# Patient Record
Sex: FEMALE | Race: ASIAN | Employment: UNEMPLOYED | URBAN - METROPOLITAN AREA
[De-identification: names, ages, dates, MRNs, and addresses within clinical notes are randomized per-mention and may not be internally consistent; named-entity substitution may affect disease eponyms.]

---

## 2017-12-21 ENCOUNTER — OFFICE VISIT (OUTPATIENT)
Dept: INTERNAL MEDICINE CLINIC | Age: 62
End: 2017-12-21

## 2017-12-21 VITALS
DIASTOLIC BLOOD PRESSURE: 70 MMHG | HEART RATE: 72 BPM | HEIGHT: 63 IN | WEIGHT: 113 LBS | SYSTOLIC BLOOD PRESSURE: 122 MMHG | OXYGEN SATURATION: 98 % | BODY MASS INDEX: 20.02 KG/M2

## 2017-12-21 DIAGNOSIS — K21.9 GASTROESOPHAGEAL REFLUX DISEASE WITHOUT ESOPHAGITIS: ICD-10-CM

## 2017-12-21 DIAGNOSIS — E04.1 THYROID NODULE: ICD-10-CM

## 2017-12-21 DIAGNOSIS — Z13.1 SCREENING FOR DIABETES MELLITUS: ICD-10-CM

## 2017-12-21 DIAGNOSIS — Z13.220 SCREENING, LIPID: ICD-10-CM

## 2017-12-21 DIAGNOSIS — R07.9 CHEST PAIN, UNSPECIFIED TYPE: Primary | ICD-10-CM

## 2017-12-21 DIAGNOSIS — J30.1 CHRONIC ALLERGIC RHINITIS DUE TO POLLEN, UNSPECIFIED SEASONALITY: ICD-10-CM

## 2017-12-21 DIAGNOSIS — H25.9 SENILE CATARACT, UNSPECIFIED AGE-RELATED CATARACT TYPE, UNSPECIFIED LATERALITY: ICD-10-CM

## 2017-12-21 DIAGNOSIS — F33.0 MILD EPISODE OF RECURRENT MAJOR DEPRESSIVE DISORDER (HCC): ICD-10-CM

## 2017-12-21 DIAGNOSIS — R41.3 MEMORY LOSS: ICD-10-CM

## 2017-12-21 LAB
A/G RATIO: 1.8 (ref 1.1–2.2)
ALBUMIN SERPL-MCNC: 5.1 G/DL (ref 3.4–5)
ALP BLD-CCNC: 147 U/L (ref 40–129)
ALT SERPL-CCNC: 17 U/L (ref 10–40)
ANION GAP SERPL CALCULATED.3IONS-SCNC: 15 MMOL/L (ref 3–16)
AST SERPL-CCNC: 32 U/L (ref 15–37)
BASOPHILS ABSOLUTE: 0 K/UL (ref 0–0.2)
BASOPHILS RELATIVE PERCENT: 0.5 %
BILIRUB SERPL-MCNC: 0.7 MG/DL (ref 0–1)
BUN BLDV-MCNC: 8 MG/DL (ref 7–20)
CALCIUM SERPL-MCNC: 9.8 MG/DL (ref 8.3–10.6)
CHLORIDE BLD-SCNC: 101 MMOL/L (ref 99–110)
CHOLESTEROL, TOTAL: 229 MG/DL (ref 0–199)
CO2: 28 MMOL/L (ref 21–32)
CREAT SERPL-MCNC: 0.8 MG/DL (ref 0.6–1.2)
EOSINOPHILS ABSOLUTE: 0.1 K/UL (ref 0–0.6)
EOSINOPHILS RELATIVE PERCENT: 1.3 %
FOLATE: 5.34 NG/ML (ref 4.78–24.2)
GFR AFRICAN AMERICAN: >60
GFR NON-AFRICAN AMERICAN: >60
GLOBULIN: 2.9 G/DL
GLUCOSE BLD-MCNC: 84 MG/DL (ref 70–99)
HCT VFR BLD CALC: 40.9 % (ref 36–48)
HDLC SERPL-MCNC: 79 MG/DL (ref 40–60)
HEMOGLOBIN: 13.1 G/DL (ref 12–16)
LDL CHOLESTEROL CALCULATED: 98 MG/DL
LYMPHOCYTES ABSOLUTE: 2.4 K/UL (ref 1–5.1)
LYMPHOCYTES RELATIVE PERCENT: 38.8 %
MCH RBC QN AUTO: 30.7 PG (ref 26–34)
MCHC RBC AUTO-ENTMCNC: 32.1 G/DL (ref 31–36)
MCV RBC AUTO: 95.6 FL (ref 80–100)
MONOCYTES ABSOLUTE: 0.5 K/UL (ref 0–1.3)
MONOCYTES RELATIVE PERCENT: 7.7 %
NEUTROPHILS ABSOLUTE: 3.1 K/UL (ref 1.7–7.7)
NEUTROPHILS RELATIVE PERCENT: 51.7 %
PDW BLD-RTO: 13.8 % (ref 12.4–15.4)
PLATELET # BLD: 123 K/UL (ref 135–450)
PMV BLD AUTO: 12.4 FL (ref 5–10.5)
POTASSIUM SERPL-SCNC: 4.6 MMOL/L (ref 3.5–5.1)
RBC # BLD: 4.27 M/UL (ref 4–5.2)
SODIUM BLD-SCNC: 144 MMOL/L (ref 136–145)
TOTAL PROTEIN: 8 G/DL (ref 6.4–8.2)
TRIGL SERPL-MCNC: 261 MG/DL (ref 0–150)
TSH REFLEX: 1.24 UIU/ML (ref 0.27–4.2)
VITAMIN B-12: 382 PG/ML (ref 211–911)
VLDLC SERPL CALC-MCNC: 52 MG/DL
WBC # BLD: 6.1 K/UL (ref 4–11)

## 2017-12-21 PROCEDURE — G8484 FLU IMMUNIZE NO ADMIN: HCPCS | Performed by: INTERNAL MEDICINE

## 2017-12-21 PROCEDURE — 99204 OFFICE O/P NEW MOD 45 MIN: CPT | Performed by: INTERNAL MEDICINE

## 2017-12-21 PROCEDURE — 3014F SCREEN MAMMO DOC REV: CPT | Performed by: INTERNAL MEDICINE

## 2017-12-21 PROCEDURE — 1036F TOBACCO NON-USER: CPT | Performed by: INTERNAL MEDICINE

## 2017-12-21 PROCEDURE — 3017F COLORECTAL CA SCREEN DOC REV: CPT | Performed by: INTERNAL MEDICINE

## 2017-12-21 PROCEDURE — G8427 DOCREV CUR MEDS BY ELIG CLIN: HCPCS | Performed by: INTERNAL MEDICINE

## 2017-12-21 PROCEDURE — G8420 CALC BMI NORM PARAMETERS: HCPCS | Performed by: INTERNAL MEDICINE

## 2017-12-21 RX ORDER — FLUTICASONE PROPIONATE 50 MCG
1 SPRAY, SUSPENSION (ML) NASAL DAILY
Qty: 1 BOTTLE | Refills: 3 | Status: SHIPPED | OUTPATIENT
Start: 2017-12-21 | End: 2018-01-19 | Stop reason: SDUPTHER

## 2017-12-21 RX ORDER — FLUTICASONE PROPIONATE 50 MCG
1 SPRAY, SUSPENSION (ML) NASAL DAILY
COMMUNITY
End: 2017-12-21 | Stop reason: SDUPTHER

## 2017-12-21 RX ORDER — SERTRALINE HYDROCHLORIDE 25 MG/1
25 TABLET, FILM COATED ORAL DAILY
Qty: 30 TABLET | Refills: 3 | Status: SHIPPED | OUTPATIENT
Start: 2017-12-21 | End: 2018-01-19 | Stop reason: SDUPTHER

## 2017-12-21 RX ORDER — PANTOPRAZOLE SODIUM 40 MG/1
40 TABLET, DELAYED RELEASE ORAL DAILY
Qty: 30 TABLET | Refills: 3 | Status: SHIPPED | OUTPATIENT
Start: 2017-12-21 | End: 2018-01-19 | Stop reason: SDUPTHER

## 2017-12-21 RX ORDER — PANTOPRAZOLE SODIUM 40 MG/1
40 TABLET, DELAYED RELEASE ORAL DAILY
COMMUNITY
End: 2017-12-21 | Stop reason: SDUPTHER

## 2017-12-21 NOTE — PROGRESS NOTES
Chief Complaint   Patient presents with    Established New Doctor    Gastroesophageal Reflux    Headache    Memory Loss    Knee Pain     both    Ankle Pain     both    Foot Pain     burning          History of Present Illness:  Cathy Henderson is a 63 y.o. female here to establish care. Chest pain- dull. Worse at night but hurts all day. No known trigger. +shortness of breath sometimes. Dry cough sometimes. +palpitations occasionally. +nausea. No syncope. In [place] she  Was referred to cardiologist  +nervous, anxious at night. She feels lonely. Seems to make the pain more. She has been forgetful the past 8-9 months Forgets where she puts things. Leaves the stove on. No wandering. Poor sleep. She also has a bad headache. Started 1 year ago. Tried tylenol but no longer helps. History:     Past Medical History:   Diagnosis Date    Allergic rhinitis        Past Surgical History:   Procedure Laterality Date    THROAT SURGERY  2017       Social History     Social History    Marital status:      Spouse name: N/A    Number of children: 4    Years of education: N/A     Occupational History    Not on file. Social History Main Topics    Smoking status: Never Smoker    Smokeless tobacco: Former User     Types: Chew    Alcohol use No    Drug use: No    Sexual activity: Not on file     Other Topics Concern    Not on file     Social History Narrative    Lives with son,        Family History   Problem Relation Age of Onset    Diabetes Neg Hx     Heart Disease Neg Hx     Cancer Neg Hx          Review of Systems:  Review of Systems   Constitutional: Negative for fatigue and fever. HENT: Negative for ear pain, hearing loss, postnasal drip, rhinorrhea, sinus pressure, sore throat and tinnitus. Eyes: Negative for redness. Respiratory: Positive for cough and shortness of breath. Negative for chest tightness and wheezing.     Cardiovascular: Positive for chest pain and

## 2017-12-21 NOTE — PATIENT INSTRUCTIONS
Chest pain  Check stress test    Blurry vision  Refer to eye doctor  [facility]  [address] [facility] [facility]  [phone]      Thyroid nodule  Refer to ENT at [facility]    GERD  Refill Protonix    Depression  Start Zoloft 25 mg daily    Memory loss  Check blood work for causes of memory loss    Routine screening  Check lipids and for diabetes    Allergies  Refill Flonase

## 2017-12-22 LAB
ESTIMATED AVERAGE GLUCOSE: 111.2 MG/DL
HBA1C MFR BLD: 5.5 %

## 2017-12-27 ASSESSMENT — ENCOUNTER SYMPTOMS
CONSTIPATION: 0
WHEEZING: 0
CHEST TIGHTNESS: 0
DIARRHEA: 0
VOMITING: 0
SINUS PRESSURE: 0
COUGH: 1
BACK PAIN: 0
SHORTNESS OF BREATH: 1
ABDOMINAL PAIN: 0
EYE REDNESS: 0
RHINORRHEA: 0
NAUSEA: 0
SORE THROAT: 0

## 2018-01-03 ENCOUNTER — HOSPITAL ENCOUNTER (OUTPATIENT)
Dept: NON INVASIVE DIAGNOSTICS | Age: 63
Discharge: HOME | End: 2018-01-03
Attending: INTERNAL MEDICINE | Admitting: INTERNAL MEDICINE

## 2018-01-03 DIAGNOSIS — R07.9 CHEST PAIN: ICD-10-CM

## 2018-01-12 ENCOUNTER — TELEPHONE (OUTPATIENT)
Dept: INTERNAL MEDICINE CLINIC | Age: 63
End: 2018-01-12

## 2018-01-19 ENCOUNTER — OFFICE VISIT (OUTPATIENT)
Dept: INTERNAL MEDICINE CLINIC | Age: 63
End: 2018-01-19

## 2018-01-19 ENCOUNTER — TELEPHONE (OUTPATIENT)
Dept: INTERNAL MEDICINE CLINIC | Age: 63
End: 2018-01-19

## 2018-01-19 VITALS
HEART RATE: 83 BPM | DIASTOLIC BLOOD PRESSURE: 70 MMHG | OXYGEN SATURATION: 97 % | BODY MASS INDEX: 19.66 KG/M2 | WEIGHT: 111 LBS | SYSTOLIC BLOOD PRESSURE: 130 MMHG

## 2018-01-19 DIAGNOSIS — F33.0 MILD EPISODE OF RECURRENT MAJOR DEPRESSIVE DISORDER (HCC): ICD-10-CM

## 2018-01-19 DIAGNOSIS — J30.1 CHRONIC ALLERGIC RHINITIS DUE TO POLLEN, UNSPECIFIED SEASONALITY: ICD-10-CM

## 2018-01-19 DIAGNOSIS — R41.3 MEMORY LOSS: ICD-10-CM

## 2018-01-19 DIAGNOSIS — G43.009 MIGRAINE WITHOUT AURA AND WITHOUT STATUS MIGRAINOSUS, NOT INTRACTABLE: Primary | ICD-10-CM

## 2018-01-19 DIAGNOSIS — K21.9 GASTROESOPHAGEAL REFLUX DISEASE WITHOUT ESOPHAGITIS: ICD-10-CM

## 2018-01-19 PROCEDURE — 3017F COLORECTAL CA SCREEN DOC REV: CPT | Performed by: INTERNAL MEDICINE

## 2018-01-19 PROCEDURE — 3014F SCREEN MAMMO DOC REV: CPT | Performed by: INTERNAL MEDICINE

## 2018-01-19 PROCEDURE — 99214 OFFICE O/P EST MOD 30 MIN: CPT | Performed by: INTERNAL MEDICINE

## 2018-01-19 PROCEDURE — 1036F TOBACCO NON-USER: CPT | Performed by: INTERNAL MEDICINE

## 2018-01-19 PROCEDURE — G8420 CALC BMI NORM PARAMETERS: HCPCS | Performed by: INTERNAL MEDICINE

## 2018-01-19 PROCEDURE — G8484 FLU IMMUNIZE NO ADMIN: HCPCS | Performed by: INTERNAL MEDICINE

## 2018-01-19 PROCEDURE — G8427 DOCREV CUR MEDS BY ELIG CLIN: HCPCS | Performed by: INTERNAL MEDICINE

## 2018-01-19 RX ORDER — PANTOPRAZOLE SODIUM 40 MG/1
40 TABLET, DELAYED RELEASE ORAL DAILY
Qty: 30 TABLET | Refills: 3 | Status: SHIPPED | OUTPATIENT
Start: 2018-01-19 | End: 2018-05-24 | Stop reason: SDUPTHER

## 2018-01-19 RX ORDER — SUMATRIPTAN 50 MG/1
50 TABLET, FILM COATED ORAL
Qty: 9 TABLET | Refills: 3 | Status: SHIPPED | OUTPATIENT
Start: 2018-01-19 | End: 2018-02-23 | Stop reason: SDUPTHER

## 2018-01-19 RX ORDER — SERTRALINE HYDROCHLORIDE 25 MG/1
25 TABLET, FILM COATED ORAL DAILY
Qty: 30 TABLET | Refills: 3 | Status: SHIPPED | OUTPATIENT
Start: 2018-01-19 | End: 2018-05-07 | Stop reason: SDUPTHER

## 2018-01-19 RX ORDER — FLUTICASONE PROPIONATE 50 MCG
1 SPRAY, SUSPENSION (ML) NASAL DAILY
Qty: 1 BOTTLE | Refills: 3 | Status: SHIPPED | OUTPATIENT
Start: 2018-01-19 | End: 2018-05-24 | Stop reason: SDUPTHER

## 2018-01-19 ASSESSMENT — ENCOUNTER SYMPTOMS
VOMITING: 0
CHEST TIGHTNESS: 0
ABDOMINAL PAIN: 0
EYE REDNESS: 0
SORE THROAT: 0
CONSTIPATION: 0
SHORTNESS OF BREATH: 0
NAUSEA: 0
WHEEZING: 0
BACK PAIN: 0
COUGH: 0
DIARRHEA: 0
RHINORRHEA: 0
SINUS PRESSURE: 0

## 2018-01-19 NOTE — PATIENT INSTRUCTIONS
Migraine  Check Brain MRI  Take Sumatriptan as needed for headache     Thyroid Nodule  Refer to [facility] ENT

## 2018-01-19 NOTE — PROGRESS NOTES
Objective:    Vitals:    01/19/18 1412   BP: 130/70   Pulse: 83   SpO2: 97%   Weight: 111 lb (50.3 kg)     Wt Readings from Last 3 Encounters:   01/19/18 111 lb (50.3 kg)   12/21/17 113 lb (51.3 kg)       Body mass index is 19.66 kg/m². Physical Exam   Constitutional: She appears well-developed and well-nourished. No distress. HENT:   Head: Normocephalic. Nose: Right sinus exhibits frontal sinus tenderness. Left sinus exhibits frontal sinus tenderness. Mouth/Throat: Oropharynx is clear and moist. No oropharyngeal exudate. Neck:       Cardiovascular: Normal rate, regular rhythm and normal heart sounds. No murmur heard. Pulmonary/Chest: Effort normal and breath sounds normal.   Abdominal: Soft. She exhibits no distension. There is no tenderness. Musculoskeletal: She exhibits no edema. Skin: Skin is warm. No rash noted. Assessment:    1. Migraine without aura and without status migrainosus, not intractable  Patient with daily headache associated with photophobia and phonophobia. Concerned for medication overuse headache. Explained this today. Sinus disease is also a possibility- she does have sinus TTP. Recommend imaging given her age and questionable sinus disease. She can take Imitrex   - MRI Brain W WO Contrast; Future  - SUMAtriptan (IMITREX) 50 MG tablet; Take 1 tablet by mouth once as needed for Migraine May repeat in two hours if no improvement for a maximum  Dispense: 9 tablet; Refill: 3    2. Gastroesophageal reflux disease without esophagitis  Continue PPI    3. Chronic allergic rhinitis due to pollen, unspecified seasonality  Continue flonase     4. Mild episode of recurrent major depressive disorder ([address]  Continue Zoloft     5.  Memory loss           Plan/Patient Instructions:    Patient Instructions   Migraine  Check Brain MRI  Take Sumatriptan as needed for headache     Thyroid Nodule  Refer to  ENT           Return in about 4 weeks (around 2/16/2018) for Routine follow up .        Marylen South

## 2018-01-29 ENCOUNTER — HOSPITAL ENCOUNTER (OUTPATIENT)
Dept: MRI IMAGING | Age: 63
Discharge: HOME | End: 2018-01-29
Attending: INTERNAL MEDICINE | Admitting: INTERNAL MEDICINE

## 2018-01-29 DIAGNOSIS — G43.009 MIGRAINE WITHOUT AURA AND WITHOUT STATUS MIGRAINOSUS, NOT INTRACTABLE: ICD-10-CM

## 2018-01-29 RX ORDER — SODIUM CHLORIDE 0.9 % (FLUSH) 0.9 %
10 SYRINGE (ML) INJECTION ONCE
Status: COMPLETED | OUTPATIENT
Start: 2018-01-29 | End: 2018-01-29

## 2018-01-29 RX ADMIN — Medication 10 ML: at 12:26

## 2018-02-07 ENCOUNTER — TELEPHONE (OUTPATIENT)
Dept: INTERNAL MEDICINE CLINIC | Age: 63
End: 2018-02-07

## 2018-02-23 ENCOUNTER — OFFICE VISIT (OUTPATIENT)
Dept: INTERNAL MEDICINE CLINIC | Age: 63
End: 2018-02-23

## 2018-02-23 ENCOUNTER — TELEPHONE (OUTPATIENT)
Dept: INTERNAL MEDICINE CLINIC | Age: 63
End: 2018-02-23

## 2018-02-23 VITALS
BODY MASS INDEX: 20.19 KG/M2 | DIASTOLIC BLOOD PRESSURE: 68 MMHG | OXYGEN SATURATION: 98 % | HEART RATE: 81 BPM | WEIGHT: 114 LBS | SYSTOLIC BLOOD PRESSURE: 110 MMHG

## 2018-02-23 DIAGNOSIS — G43.009 MIGRAINE WITHOUT AURA AND WITHOUT STATUS MIGRAINOSUS, NOT INTRACTABLE: ICD-10-CM

## 2018-02-23 DIAGNOSIS — K21.9 GASTROESOPHAGEAL REFLUX DISEASE WITHOUT ESOPHAGITIS: ICD-10-CM

## 2018-02-23 DIAGNOSIS — F33.0 MILD EPISODE OF RECURRENT MAJOR DEPRESSIVE DISORDER (HCC): ICD-10-CM

## 2018-02-23 DIAGNOSIS — G47.62 NOCTURNAL LEG CRAMPS: ICD-10-CM

## 2018-02-23 DIAGNOSIS — F02.80 ALZHEIMER'S DEMENTIA WITHOUT BEHAVIORAL DISTURBANCE, UNSPECIFIED TIMING OF DEMENTIA ONSET: ICD-10-CM

## 2018-02-23 DIAGNOSIS — G47.62 NOCTURNAL LEG CRAMPS: Primary | ICD-10-CM

## 2018-02-23 DIAGNOSIS — E04.1 THYROID NODULE: ICD-10-CM

## 2018-02-23 DIAGNOSIS — G30.9 ALZHEIMER'S DEMENTIA WITHOUT BEHAVIORAL DISTURBANCE, UNSPECIFIED TIMING OF DEMENTIA ONSET: ICD-10-CM

## 2018-02-23 DIAGNOSIS — J30.1 CHRONIC ALLERGIC RHINITIS DUE TO POLLEN, UNSPECIFIED SEASONALITY: ICD-10-CM

## 2018-02-23 LAB
IRON SATURATION: 31 % (ref 15–50)
IRON: 131 UG/DL (ref 37–145)
TOTAL IRON BINDING CAPACITY: 423 UG/DL (ref 260–445)
VITAMIN D 25-HYDROXY: 16.6 NG/ML

## 2018-02-23 PROCEDURE — 1036F TOBACCO NON-USER: CPT | Performed by: INTERNAL MEDICINE

## 2018-02-23 PROCEDURE — G8420 CALC BMI NORM PARAMETERS: HCPCS | Performed by: INTERNAL MEDICINE

## 2018-02-23 PROCEDURE — G8484 FLU IMMUNIZE NO ADMIN: HCPCS | Performed by: INTERNAL MEDICINE

## 2018-02-23 PROCEDURE — 99214 OFFICE O/P EST MOD 30 MIN: CPT | Performed by: INTERNAL MEDICINE

## 2018-02-23 PROCEDURE — 3014F SCREEN MAMMO DOC REV: CPT | Performed by: INTERNAL MEDICINE

## 2018-02-23 PROCEDURE — 3017F COLORECTAL CA SCREEN DOC REV: CPT | Performed by: INTERNAL MEDICINE

## 2018-02-23 PROCEDURE — G8427 DOCREV CUR MEDS BY ELIG CLIN: HCPCS | Performed by: INTERNAL MEDICINE

## 2018-02-23 RX ORDER — DONEPEZIL HYDROCHLORIDE 5 MG/1
5 TABLET, FILM COATED ORAL NIGHTLY
Qty: 30 TABLET | Refills: 3 | Status: SHIPPED | OUTPATIENT
Start: 2018-02-23 | End: 2018-05-24 | Stop reason: SDUPTHER

## 2018-02-23 RX ORDER — SUMATRIPTAN 50 MG/1
50 TABLET, FILM COATED ORAL
Qty: 9 TABLET | Refills: 3 | Status: SHIPPED | OUTPATIENT
Start: 2018-02-23 | End: 2018-05-24 | Stop reason: SDUPTHER

## 2018-02-23 RX ORDER — DONEPEZIL HYDROCHLORIDE 5 MG/1
5 TABLET, FILM COATED ORAL NIGHTLY
Qty: 30 TABLET | Refills: 3 | Status: SHIPPED | OUTPATIENT
Start: 2018-02-23 | End: 2018-02-23 | Stop reason: SDUPTHER

## 2018-02-23 ASSESSMENT — ENCOUNTER SYMPTOMS
EYES NEGATIVE: 1
RESPIRATORY NEGATIVE: 1
ABDOMINAL PAIN: 1
BACK PAIN: 1

## 2018-02-23 NOTE — PROGRESS NOTES
depression 30 tablet 3     No current facility-administered medications on file prior to visit. Review of Systems:    Review of Systems   Constitutional: Negative for appetite change, fatigue and unexpected weight change. HENT: Negative. Eyes: Negative. Respiratory: Negative. Cardiovascular: Negative. Gastrointestinal: Positive for abdominal pain (heartburn). Endocrine: Negative. Genitourinary: Negative. Musculoskeletal: Positive for back pain and myalgias. Neurological: Positive for tingling, numbness and headaches (better with Imitrex ). Psychiatric/Behavioral: Positive for confusion (patient cannot remember anything) and dysphoric mood. Negative for agitation. The patient does not have insomnia. Objective:    Vitals:    02/23/18 1029   BP: 110/68   Pulse: 81   SpO2: 98%   Weight: 114 lb (51.7 kg)     Wt Readings from Last 3 Encounters:   02/23/18 114 lb (51.7 kg)   01/19/18 111 lb (50.3 kg)   12/21/17 113 lb (51.3 kg)       Body mass index is 20.19 kg/m². Physical Exam   Constitutional: She appears well-developed and well-nourished. She does not have a sickly appearance. HENT:   Head: Atraumatic. Right Ear: Hearing, tympanic membrane, external ear and ear canal normal.   Left Ear: Hearing, tympanic membrane, external ear and ear canal normal.   Nose: Nose normal. No mucosal edema or rhinorrhea. Eyes: Pupils are equal, round, and reactive to light. No scleral icterus. Neck: Trachea normal. No thyroid mass and no thyromegaly present. Cardiovascular: Normal rate, regular rhythm, S1 normal and S2 normal.    No murmur heard. Pulmonary/Chest: Effort normal and breath sounds normal. No respiratory distress. She has no wheezes. She has no rhonchi. She has no rales. Abdominal: Soft. Bowel sounds are normal. There is no tenderness. Lymphadenopathy:     She has no cervical adenopathy. Neurological: She is alert. She is not disoriented. She displays no tremor.  No cranial nerve deficit. She exhibits normal muscle tone. Coordination and gait normal.   Skin: Skin is warm and dry. No rash noted. Assessment:    1. Nocturnal leg cramps  Check iron and vitamin D. She reports drinking plenty of fluids. Provided stretches   - Iron and TIBC; Future  - Vitamin D 25 Hydroxy; Future    2. Alzheimer's dementia without behavioral disturbance, unspecified timing of dementia onset  She had a normal MRI, B12, folate, TSH. Concerned for dementia. Recommend trial of Aricept   - donepezil (ARICEPT) 5 MG tablet; Take 1 tablet by mouth nightly For dementia  Dispense: 30 tablet; Refill: 3    3. Migraine without aura and without status migrainosus, not intractable  Improved on sumatriptan   - SUMAtriptan (IMITREX) 50 MG tablet; Take 1 tablet by mouth once as needed for Migraine May repeat in two hours if no improvement. Max two tabs a day. For headache  Dispense: 9 tablet; Refill: 3    4. Thyroid nodule  Encouraged patient to schedule an appointment with ENT at [facility]     5. Chronic allergic rhinitis due to pollen, unspecified seasonality  Continue flonase     6. Gastroesophageal reflux disease without esophagitis  Continue PPI     7. Mild episode of recurrent major depressive disorder (HCC)  Stable. Plan/Patient Instructions:    Patient Instructions   Leg cramps  Do stretches twice a day  Check iron and vitamin D    Dementia  Start Aricept    Migraine  Continue Imitrex as needed     Thyroid Nodule  Schedule an appointment with  ENT    Allergies  Continue Flonase    Depression   Continue Zoloft        Return in about 3 months (around 5/23/2018) for Dementia, depression, leg cramps .        Teresita Dong

## 2018-02-23 NOTE — PATIENT INSTRUCTIONS
Leg cramps  Do stretches twice a day  Check iron and vitamin D    Dementia  Start Aricept    Migraine  Continue Imitrex as needed     Thyroid Nodule  Schedule an appointment with [facility]    Allergies  Continue Flonase    Depression   Continue Zoloft

## 2018-02-28 DIAGNOSIS — E55.9 VITAMIN D DEFICIENCY: Primary | ICD-10-CM

## 2018-02-28 RX ORDER — ERGOCALCIFEROL (VITAMIN D2) 1250 MCG
50000 CAPSULE ORAL WEEKLY
Qty: 12 CAPSULE | Refills: 0 | Status: SHIPPED | OUTPATIENT
Start: 2018-02-28 | End: 2018-05-11 | Stop reason: SDUPTHER

## 2018-02-28 RX ORDER — CARBOXYMETHYLCELLULOSE SODIUM 5 MG/ML
1 SOLUTION/ DROPS OPHTHALMIC 4 TIMES DAILY
Qty: 1 BOTTLE | Refills: 5 | Status: SHIPPED | OUTPATIENT
Start: 2018-02-28 | End: 2018-05-24 | Stop reason: SDUPTHER

## 2018-05-07 DIAGNOSIS — F33.0 MILD EPISODE OF RECURRENT MAJOR DEPRESSIVE DISORDER (HCC): ICD-10-CM

## 2018-05-07 DIAGNOSIS — R41.3 MEMORY LOSS: ICD-10-CM

## 2018-05-07 RX ORDER — SERTRALINE HYDROCHLORIDE 25 MG/1
25 TABLET, FILM COATED ORAL DAILY
Qty: 30 TABLET | Refills: 3 | Status: SHIPPED | OUTPATIENT
Start: 2018-05-07 | End: 2018-08-23 | Stop reason: SDUPTHER

## 2018-05-11 DIAGNOSIS — E55.9 VITAMIN D DEFICIENCY: ICD-10-CM

## 2018-05-11 RX ORDER — ERGOCALCIFEROL 1.25 MG/1
CAPSULE ORAL
Qty: 12 CAPSULE | Refills: 0 | Status: SHIPPED | OUTPATIENT
Start: 2018-05-11 | End: 2018-07-27 | Stop reason: SDUPTHER

## 2018-05-24 ENCOUNTER — OFFICE VISIT (OUTPATIENT)
Dept: INTERNAL MEDICINE CLINIC | Age: 63
End: 2018-05-24

## 2018-05-24 VITALS
HEART RATE: 83 BPM | SYSTOLIC BLOOD PRESSURE: 104 MMHG | DIASTOLIC BLOOD PRESSURE: 66 MMHG | WEIGHT: 111 LBS | BODY MASS INDEX: 19.66 KG/M2 | OXYGEN SATURATION: 98 %

## 2018-05-24 DIAGNOSIS — Z11.4 SCREENING FOR HIV (HUMAN IMMUNODEFICIENCY VIRUS): ICD-10-CM

## 2018-05-24 DIAGNOSIS — Z12.31 ENCOUNTER FOR SCREENING MAMMOGRAM FOR MALIGNANT NEOPLASM OF BREAST: ICD-10-CM

## 2018-05-24 DIAGNOSIS — K21.9 GASTROESOPHAGEAL REFLUX DISEASE WITHOUT ESOPHAGITIS: Primary | ICD-10-CM

## 2018-05-24 DIAGNOSIS — E55.9 VITAMIN D DEFICIENCY: ICD-10-CM

## 2018-05-24 DIAGNOSIS — Z12.11 ENCOUNTER FOR SCREENING COLONOSCOPY: ICD-10-CM

## 2018-05-24 DIAGNOSIS — Z11.59 ENCOUNTER FOR HEPATITIS C SCREENING TEST FOR LOW RISK PATIENT: ICD-10-CM

## 2018-05-24 DIAGNOSIS — J30.1 CHRONIC ALLERGIC RHINITIS DUE TO POLLEN, UNSPECIFIED SEASONALITY: ICD-10-CM

## 2018-05-24 DIAGNOSIS — F02.80 ALZHEIMER'S DEMENTIA WITHOUT BEHAVIORAL DISTURBANCE, UNSPECIFIED TIMING OF DEMENTIA ONSET: ICD-10-CM

## 2018-05-24 DIAGNOSIS — M94.0 COSTOCHONDRITIS: ICD-10-CM

## 2018-05-24 DIAGNOSIS — G43.009 MIGRAINE WITHOUT AURA AND WITHOUT STATUS MIGRAINOSUS, NOT INTRACTABLE: ICD-10-CM

## 2018-05-24 DIAGNOSIS — G30.9 ALZHEIMER'S DEMENTIA WITHOUT BEHAVIORAL DISTURBANCE, UNSPECIFIED TIMING OF DEMENTIA ONSET: ICD-10-CM

## 2018-05-24 LAB
A/G RATIO: 1.8 (ref 1.1–2.2)
ALBUMIN SERPL-MCNC: 4.8 G/DL (ref 3.4–5)
ALP BLD-CCNC: 110 U/L (ref 40–129)
ALT SERPL-CCNC: 15 U/L (ref 10–40)
ANION GAP SERPL CALCULATED.3IONS-SCNC: 11 MMOL/L (ref 3–16)
AST SERPL-CCNC: 28 U/L (ref 15–37)
BASOPHILS ABSOLUTE: 0 K/UL (ref 0–0.2)
BASOPHILS RELATIVE PERCENT: 1 %
BILIRUB SERPL-MCNC: 0.6 MG/DL (ref 0–1)
BUN BLDV-MCNC: 9 MG/DL (ref 7–20)
CALCIUM SERPL-MCNC: 9.8 MG/DL (ref 8.3–10.6)
CHLORIDE BLD-SCNC: 98 MMOL/L (ref 99–110)
CO2: 29 MMOL/L (ref 21–32)
CREAT SERPL-MCNC: 0.7 MG/DL (ref 0.6–1.2)
EOSINOPHILS ABSOLUTE: 0.1 K/UL (ref 0–0.6)
EOSINOPHILS RELATIVE PERCENT: 2.5 %
GFR AFRICAN AMERICAN: >60
GFR NON-AFRICAN AMERICAN: >60
GLOBULIN: 2.7 G/DL
GLUCOSE BLD-MCNC: 78 MG/DL (ref 70–99)
HCT VFR BLD CALC: 40.7 % (ref 36–48)
HEMOGLOBIN: 13.5 G/DL (ref 12–16)
HEPATITIS C ANTIBODY: NORMAL
LYMPHOCYTES ABSOLUTE: 1.7 K/UL (ref 1–5.1)
LYMPHOCYTES RELATIVE PERCENT: 36.3 %
MCH RBC QN AUTO: 30.9 PG (ref 26–34)
MCHC RBC AUTO-ENTMCNC: 33.3 G/DL (ref 31–36)
MCV RBC AUTO: 93 FL (ref 80–100)
MONOCYTES ABSOLUTE: 0.5 K/UL (ref 0–1.3)
MONOCYTES RELATIVE PERCENT: 10.1 %
NEUTROPHILS ABSOLUTE: 2.3 K/UL (ref 1.7–7.7)
NEUTROPHILS RELATIVE PERCENT: 50.1 %
PDW BLD-RTO: 13.6 % (ref 12.4–15.4)
PLATELET # BLD: 132 K/UL (ref 135–450)
PMV BLD AUTO: 11.4 FL (ref 5–10.5)
POTASSIUM SERPL-SCNC: 5 MMOL/L (ref 3.5–5.1)
RBC # BLD: 4.38 M/UL (ref 4–5.2)
SODIUM BLD-SCNC: 138 MMOL/L (ref 136–145)
TOTAL PROTEIN: 7.5 G/DL (ref 6.4–8.2)
VITAMIN D 25-HYDROXY: 28.8 NG/ML
WBC # BLD: 4.6 K/UL (ref 4–11)

## 2018-05-24 PROCEDURE — 99214 OFFICE O/P EST MOD 30 MIN: CPT | Performed by: INTERNAL MEDICINE

## 2018-05-24 PROCEDURE — 1036F TOBACCO NON-USER: CPT | Performed by: INTERNAL MEDICINE

## 2018-05-24 PROCEDURE — G8427 DOCREV CUR MEDS BY ELIG CLIN: HCPCS | Performed by: INTERNAL MEDICINE

## 2018-05-24 PROCEDURE — 3017F COLORECTAL CA SCREEN DOC REV: CPT | Performed by: INTERNAL MEDICINE

## 2018-05-24 PROCEDURE — G8420 CALC BMI NORM PARAMETERS: HCPCS | Performed by: INTERNAL MEDICINE

## 2018-05-24 RX ORDER — DONEPEZIL HYDROCHLORIDE 5 MG/1
5 TABLET, FILM COATED ORAL NIGHTLY
Qty: 30 TABLET | Refills: 3 | Status: SHIPPED | OUTPATIENT
Start: 2018-05-24 | End: 2018-09-25 | Stop reason: SDUPTHER

## 2018-05-24 RX ORDER — FLUTICASONE PROPIONATE 50 MCG
1 SPRAY, SUSPENSION (ML) NASAL DAILY
Qty: 1 BOTTLE | Refills: 3 | Status: SHIPPED | OUTPATIENT
Start: 2018-05-24 | End: 2019-01-11 | Stop reason: SDUPTHER

## 2018-05-24 RX ORDER — CARBOXYMETHYLCELLULOSE SODIUM 5 MG/ML
1 SOLUTION/ DROPS OPHTHALMIC 4 TIMES DAILY
Qty: 1 BOTTLE | Refills: 5 | Status: SHIPPED | OUTPATIENT
Start: 2018-05-24 | End: 2019-06-24 | Stop reason: SDUPTHER

## 2018-05-24 RX ORDER — SUMATRIPTAN 50 MG/1
50 TABLET, FILM COATED ORAL
Qty: 9 TABLET | Refills: 3 | Status: SHIPPED | OUTPATIENT
Start: 2018-05-24 | End: 2019-03-22 | Stop reason: SDUPTHER

## 2018-05-24 RX ORDER — PANTOPRAZOLE SODIUM 40 MG/1
40 TABLET, DELAYED RELEASE ORAL DAILY
Qty: 30 TABLET | Refills: 3 | Status: SHIPPED | OUTPATIENT
Start: 2018-05-24 | End: 2018-09-18 | Stop reason: SDUPTHER

## 2018-05-24 RX ORDER — SUCRALFATE ORAL 1 G/10ML
1 SUSPENSION ORAL NIGHTLY
Qty: 1200 ML | Refills: 3 | Status: SHIPPED | OUTPATIENT
Start: 2018-05-24 | End: 2019-05-29 | Stop reason: SDUPTHER

## 2018-05-24 ASSESSMENT — ENCOUNTER SYMPTOMS
SHORTNESS OF BREATH: 1
NAUSEA: 1
ABDOMINAL PAIN: 1

## 2018-05-24 ASSESSMENT — PATIENT HEALTH QUESTIONNAIRE - PHQ9
SUM OF ALL RESPONSES TO PHQ9 QUESTIONS 1 & 2: 2
1. LITTLE INTEREST OR PLEASURE IN DOING THINGS: 1
2. FEELING DOWN, DEPRESSED OR HOPELESS: 1
SUM OF ALL RESPONSES TO PHQ QUESTIONS 1-9: 2

## 2018-05-25 LAB
HIV AG/AB: NORMAL
HIV ANTIGEN: NORMAL
HIV-1 ANTIBODY: NORMAL
HIV-2 AB: NORMAL

## 2018-05-28 ASSESSMENT — ENCOUNTER SYMPTOMS
CHEST TIGHTNESS: 0
COUGH: 0
EYE REDNESS: 0
HEMOPTYSIS: 0
CONSTIPATION: 0
SORE THROAT: 0
DIARRHEA: 0
SPUTUM PRODUCTION: 0
WHEEZING: 0
BACK PAIN: 0
RHINORRHEA: 0
SINUS PRESSURE: 0
ORTHOPNEA: 0
VOMITING: 0

## 2018-05-29 ENCOUNTER — HOSPITAL ENCOUNTER (OUTPATIENT)
Dept: MAMMOGRAPHY | Age: 63
Discharge: HOME | End: 2018-05-29
Attending: INTERNAL MEDICINE | Admitting: INTERNAL MEDICINE

## 2018-05-29 ENCOUNTER — TELEPHONE (OUTPATIENT)
Dept: INTERNAL MEDICINE CLINIC | Age: 63
End: 2018-05-29

## 2018-05-29 DIAGNOSIS — Z12.31 ENCOUNTER FOR SCREENING MAMMOGRAM FOR MALIGNANT NEOPLASM OF BREAST: ICD-10-CM

## 2018-06-20 ENCOUNTER — TELEPHONE (OUTPATIENT)
Dept: INTERNAL MEDICINE CLINIC | Age: 63
End: 2018-06-20

## 2018-06-21 ENCOUNTER — TELEPHONE (OUTPATIENT)
Dept: INTERNAL MEDICINE CLINIC | Age: 63
End: 2018-06-21

## 2018-07-25 ENCOUNTER — TELEPHONE (OUTPATIENT)
Dept: INTERNAL MEDICINE CLINIC | Age: 63
End: 2018-07-25

## 2018-07-27 ENCOUNTER — OFFICE VISIT (OUTPATIENT)
Dept: INTERNAL MEDICINE CLINIC | Age: 63
End: 2018-07-27

## 2018-07-27 VITALS
OXYGEN SATURATION: 98 % | BODY MASS INDEX: 19.84 KG/M2 | SYSTOLIC BLOOD PRESSURE: 98 MMHG | DIASTOLIC BLOOD PRESSURE: 60 MMHG | WEIGHT: 112 LBS | HEART RATE: 71 BPM

## 2018-07-27 DIAGNOSIS — F01.50 VASCULAR DEMENTIA WITHOUT BEHAVIORAL DISTURBANCE (HCC): ICD-10-CM

## 2018-07-27 DIAGNOSIS — E55.9 VITAMIN D DEFICIENCY: ICD-10-CM

## 2018-07-27 DIAGNOSIS — G43.009 MIGRAINE WITHOUT AURA AND WITHOUT STATUS MIGRAINOSUS, NOT INTRACTABLE: ICD-10-CM

## 2018-07-27 DIAGNOSIS — K21.9 GASTROESOPHAGEAL REFLUX DISEASE WITHOUT ESOPHAGITIS: Primary | ICD-10-CM

## 2018-07-27 DIAGNOSIS — G47.62 NOCTURNAL LEG CRAMPS: ICD-10-CM

## 2018-07-27 PROBLEM — F02.80 DEMENTIA ASSOCIATED WITH OTHER UNDERLYING DISEASE WITHOUT BEHAVIORAL DISTURBANCE (HCC): Status: ACTIVE | Noted: 2018-07-27

## 2018-07-27 PROCEDURE — 3017F COLORECTAL CA SCREEN DOC REV: CPT | Performed by: INTERNAL MEDICINE

## 2018-07-27 PROCEDURE — 1036F TOBACCO NON-USER: CPT | Performed by: INTERNAL MEDICINE

## 2018-07-27 PROCEDURE — G8420 CALC BMI NORM PARAMETERS: HCPCS | Performed by: INTERNAL MEDICINE

## 2018-07-27 PROCEDURE — 99214 OFFICE O/P EST MOD 30 MIN: CPT | Performed by: INTERNAL MEDICINE

## 2018-07-27 PROCEDURE — G8427 DOCREV CUR MEDS BY ELIG CLIN: HCPCS | Performed by: INTERNAL MEDICINE

## 2018-07-27 RX ORDER — MULTIVIT-MIN/IRON FUM/FOLIC AC 7.5 MG-4
1 TABLET ORAL DAILY
Qty: 30 TABLET | Refills: 5 | Status: SHIPPED | OUTPATIENT
Start: 2018-07-27 | End: 2019-01-21 | Stop reason: SDUPTHER

## 2018-07-27 RX ORDER — ERGOCALCIFEROL 1.25 MG/1
CAPSULE ORAL
Qty: 12 CAPSULE | Refills: 0 | Status: SHIPPED | OUTPATIENT
Start: 2018-07-27 | End: 2018-10-13 | Stop reason: SDUPTHER

## 2018-07-27 ASSESSMENT — ENCOUNTER SYMPTOMS
VOMITING: 0
CHEST TIGHTNESS: 0
ABDOMINAL PAIN: 0
WHEEZING: 0
SORE THROAT: 0
SHORTNESS OF BREATH: 0
CONSTIPATION: 0
RHINORRHEA: 0
NAUSEA: 0
BACK PAIN: 0
SINUS PRESSURE: 0
DIARRHEA: 0
COUGH: 0
EYE REDNESS: 0

## 2018-07-27 NOTE — PROGRESS NOTES
MG tablet Take 1 tablet by mouth nightly For dementia 30 tablet 3    carboxymethylcellulose PF (REFRESH PLUS) 0.5 % SOLN ophthalmic solution Place 1 drop into both eyes 4 times daily 1 Bottle 5    sucralfate (CARAFATE) 1 GM/10ML suspension Take 10 mLs by mouth nightly For chest pain/gastric pain 1200 mL 3    diclofenac sodium 1 % GEL Apply 2 g topically 2 times daily For chest wall pain 1 Tube 3    sertraline (ZOLOFT) 25 MG tablet TAKE 1 TABLET BY MOUTH DAILY FOR DEPRESSION 30 tablet 3     No current facility-administered medications on file prior to visit. Review of Systems:    Review of Systems   Constitutional: Negative for fatigue and fever. HENT: Negative for ear pain, hearing loss, postnasal drip, rhinorrhea, sinus pressure, sore throat and tinnitus. Eyes: Negative for redness. Respiratory: Negative for cough, chest tightness, shortness of breath and wheezing. Cardiovascular: Negative for chest pain, palpitations and leg swelling. Gastrointestinal: Negative for abdominal pain, constipation, diarrhea, nausea and vomiting. Genitourinary: Negative for dysuria and frequency. Musculoskeletal: Positive for myalgias. Negative for arthralgias, back pain and joint swelling. Skin: Negative for rash. Neurological: Negative for dizziness, syncope and headaches. Psychiatric/Behavioral: Positive for confusion. Objective:    Vitals:    07/27/18 0921   BP: 98/60   Pulse: 71   SpO2: 98%   Weight: 112 lb (50.8 kg)     Wt Readings from Last 3 Encounters:   07/27/18 112 lb (50.8 kg)   05/24/18 111 lb (50.3 kg)   02/23/18 114 lb (51.7 kg)       Body mass index is 19.84 kg/m². Physical Exam   Constitutional: She appears well-developed and well-nourished. She does not have a sickly appearance. HENT:   Head: Atraumatic.    Right Ear: Hearing, tympanic membrane, external ear and ear canal normal.   Left Ear: Hearing, tympanic membrane, external ear and ear canal normal.   Nose: Nose

## 2018-08-01 ENCOUNTER — TELEPHONE (OUTPATIENT)
Dept: RHEUMATOLOGY | Age: 63
End: 2018-08-01

## 2018-08-02 NOTE — TELEPHONE ENCOUNTER
Received a DENIAL for this medication. States this medication is not covered for the dx of chondrocostal junction syndrome. Denial attached    Please advise patient.

## 2018-08-23 DIAGNOSIS — R41.3 MEMORY LOSS: ICD-10-CM

## 2018-08-23 DIAGNOSIS — F33.0 MILD EPISODE OF RECURRENT MAJOR DEPRESSIVE DISORDER (HCC): ICD-10-CM

## 2018-08-23 RX ORDER — SERTRALINE HYDROCHLORIDE 25 MG/1
25 TABLET, FILM COATED ORAL DAILY
Qty: 30 TABLET | Refills: 3 | Status: SHIPPED | OUTPATIENT
Start: 2018-08-23 | End: 2018-12-25 | Stop reason: SDUPTHER

## 2018-09-18 RX ORDER — PANTOPRAZOLE SODIUM 40 MG/1
40 TABLET, DELAYED RELEASE ORAL DAILY
Qty: 30 TABLET | Refills: 3 | Status: SHIPPED | OUTPATIENT
Start: 2018-09-18 | End: 2019-06-24 | Stop reason: SDUPTHER

## 2018-09-25 DIAGNOSIS — F02.80 ALZHEIMER'S DEMENTIA WITHOUT BEHAVIORAL DISTURBANCE, UNSPECIFIED TIMING OF DEMENTIA ONSET: ICD-10-CM

## 2018-09-25 DIAGNOSIS — G30.9 ALZHEIMER'S DEMENTIA WITHOUT BEHAVIORAL DISTURBANCE, UNSPECIFIED TIMING OF DEMENTIA ONSET: ICD-10-CM

## 2018-09-25 RX ORDER — DONEPEZIL HYDROCHLORIDE 5 MG/1
5 TABLET, FILM COATED ORAL NIGHTLY
Qty: 30 TABLET | Refills: 3 | Status: SHIPPED | OUTPATIENT
Start: 2018-09-25 | End: 2019-06-24 | Stop reason: SDUPTHER

## 2018-09-27 ENCOUNTER — TELEPHONE (OUTPATIENT)
Dept: INTERNAL MEDICINE CLINIC | Age: 63
End: 2018-09-27

## 2018-10-13 DIAGNOSIS — E55.9 VITAMIN D DEFICIENCY: ICD-10-CM

## 2018-10-15 RX ORDER — ERGOCALCIFEROL 1.25 MG/1
CAPSULE ORAL
Qty: 12 CAPSULE | Refills: 0 | Status: SHIPPED | OUTPATIENT
Start: 2018-10-15 | End: 2019-03-06 | Stop reason: SDUPTHER

## 2018-11-26 ENCOUNTER — OFFICE VISIT (OUTPATIENT)
Dept: INTERNAL MEDICINE CLINIC | Age: 63
End: 2018-11-26
Payer: COMMERCIAL

## 2018-11-26 VITALS
SYSTOLIC BLOOD PRESSURE: 106 MMHG | WEIGHT: 114 LBS | BODY MASS INDEX: 18.97 KG/M2 | DIASTOLIC BLOOD PRESSURE: 70 MMHG | HEART RATE: 79 BPM | OXYGEN SATURATION: 100 %

## 2018-11-26 DIAGNOSIS — E55.9 VITAMIN D DEFICIENCY: ICD-10-CM

## 2018-11-26 DIAGNOSIS — M54.50 ACUTE MIDLINE LOW BACK PAIN WITHOUT SCIATICA: Primary | ICD-10-CM

## 2018-11-26 DIAGNOSIS — Z23 NEED FOR HEPATITIS A VACCINATION: ICD-10-CM

## 2018-11-26 DIAGNOSIS — Z23 NEED FOR PROPHYLACTIC VACCINATION AGAINST CHOLERA WITH TYPHOID-PARATYPHOID (CHOLERA + TAB) VACCINE: ICD-10-CM

## 2018-11-26 DIAGNOSIS — E78.5 DYSLIPIDEMIA: ICD-10-CM

## 2018-11-26 DIAGNOSIS — M85.88 OSTEOPENIA OF LUMBAR SPINE: ICD-10-CM

## 2018-11-26 LAB
A/G RATIO: 1.6 (ref 1.1–2.2)
ALBUMIN SERPL-MCNC: 4.6 G/DL (ref 3.4–5)
ALP BLD-CCNC: 128 U/L (ref 40–129)
ALT SERPL-CCNC: 14 U/L (ref 10–40)
ANION GAP SERPL CALCULATED.3IONS-SCNC: 13 MMOL/L (ref 3–16)
AST SERPL-CCNC: 27 U/L (ref 15–37)
BILIRUB SERPL-MCNC: 0.8 MG/DL (ref 0–1)
BUN BLDV-MCNC: 7 MG/DL (ref 7–20)
CALCIUM SERPL-MCNC: 9.7 MG/DL (ref 8.3–10.6)
CHLORIDE BLD-SCNC: 101 MMOL/L (ref 99–110)
CHOLESTEROL, TOTAL: 192 MG/DL (ref 0–199)
CO2: 28 MMOL/L (ref 21–32)
CREAT SERPL-MCNC: 0.8 MG/DL (ref 0.6–1.2)
GFR AFRICAN AMERICAN: >60
GFR NON-AFRICAN AMERICAN: >60
GLOBULIN: 2.8 G/DL
GLUCOSE BLD-MCNC: 80 MG/DL (ref 70–99)
HDLC SERPL-MCNC: 75 MG/DL (ref 40–60)
LDL CHOLESTEROL CALCULATED: 86 MG/DL
POTASSIUM SERPL-SCNC: 4.9 MMOL/L (ref 3.5–5.1)
SODIUM BLD-SCNC: 142 MMOL/L (ref 136–145)
TOTAL PROTEIN: 7.4 G/DL (ref 6.4–8.2)
TRIGL SERPL-MCNC: 157 MG/DL (ref 0–150)
VITAMIN D 25-HYDROXY: 39.9 NG/ML
VLDLC SERPL CALC-MCNC: 31 MG/DL

## 2018-11-26 PROCEDURE — 99214 OFFICE O/P EST MOD 30 MIN: CPT | Performed by: INTERNAL MEDICINE

## 2018-11-26 PROCEDURE — 3017F COLORECTAL CA SCREEN DOC REV: CPT | Performed by: INTERNAL MEDICINE

## 2018-11-26 PROCEDURE — 90686 IIV4 VACC NO PRSV 0.5 ML IM: CPT | Performed by: INTERNAL MEDICINE

## 2018-11-26 PROCEDURE — 90471 IMMUNIZATION ADMIN: CPT | Performed by: INTERNAL MEDICINE

## 2018-11-26 PROCEDURE — 1036F TOBACCO NON-USER: CPT | Performed by: INTERNAL MEDICINE

## 2018-11-26 PROCEDURE — 90472 IMMUNIZATION ADMIN EACH ADD: CPT | Performed by: INTERNAL MEDICINE

## 2018-11-26 PROCEDURE — G8482 FLU IMMUNIZE ORDER/ADMIN: HCPCS | Performed by: INTERNAL MEDICINE

## 2018-11-26 PROCEDURE — 90632 HEPA VACCINE ADULT IM: CPT | Performed by: INTERNAL MEDICINE

## 2018-11-26 PROCEDURE — G8420 CALC BMI NORM PARAMETERS: HCPCS | Performed by: INTERNAL MEDICINE

## 2018-11-26 PROCEDURE — G8427 DOCREV CUR MEDS BY ELIG CLIN: HCPCS | Performed by: INTERNAL MEDICINE

## 2018-11-26 RX ORDER — B-COMPLEX WITH VITAMIN C
1 TABLET ORAL DAILY
Qty: 30 TABLET | Refills: 5 | Status: SHIPPED | OUTPATIENT
Start: 2018-11-26 | End: 2019-05-23 | Stop reason: SDUPTHER

## 2018-11-26 ASSESSMENT — ENCOUNTER SYMPTOMS: BACK PAIN: 1

## 2018-11-26 NOTE — PROGRESS NOTES
Vaccine Information Sheet, \"Influenza - Inactivated\"  given to Alejandro Ayala, or parent/legal guardian of  Alejandro Ayala and verbalized understanding. Patient responses:    Have you ever had a reaction to a flu vaccine? No  Are you able to eat eggs without adverse effects? Yes  Do you have any current illness? No  Have you ever had Guillian Potter Syndrome? No    Flu vaccine given per order. Please see immunization tab.

## 2018-11-26 NOTE — PATIENT INSTRUCTIONS
Low back pain  Take tylenol as needed   Apply topical diclofenac to the back  Sleep with a pillow under your knees  Check for osteoporosis   Schedule PT assessment    Start calcium supplement     Check fasting lipids     Start Typhoid vaccine a week prior to leaving for [place]

## 2018-11-26 NOTE — PROGRESS NOTES
Cardiovascular: Negative for chest pain, palpitations and leg swelling. Gastrointestinal: Negative for abdominal pain, bowel incontinence, constipation, diarrhea, dysphagia, heartburn, nausea and vomiting. Genitourinary: Negative for bladder incontinence, dysuria, frequency and pelvic pain. Musculoskeletal: Positive for back pain. Negative for arthralgias and joint swelling. Skin: Negative for rash. Neurological: Negative for dizziness, tingling, syncope, weakness, numbness, headaches and paresthesias. Objective:    Vitals:    11/26/18 0945   BP: 106/70   Pulse: 79   SpO2: 100%   Weight: 114 lb (51.7 kg)     Wt Readings from Last 3 Encounters:   11/26/18 114 lb (51.7 kg)   09/17/18 134 lb (60.8 kg)   07/27/18 112 lb (50.8 kg)       Body mass index is 18.97 kg/m². Physical Exam   Constitutional: She appears well-developed and well-nourished. She does not have a sickly appearance. HENT:   Head: Atraumatic. Right Ear: Hearing, tympanic membrane, external ear and ear canal normal.   Left Ear: Hearing, tympanic membrane, external ear and ear canal normal.   Nose: Nose normal. No mucosal edema or rhinorrhea. Eyes: Pupils are equal, round, and reactive to light. No scleral icterus. Neck: Trachea normal. No thyroid mass and no thyromegaly present. Cardiovascular: Normal rate, regular rhythm, S1 normal, S2 normal, normal heart sounds and intact distal pulses. No murmur heard. Pulmonary/Chest: Effort normal and breath sounds normal. No respiratory distress. She has no wheezes. She has no rhonchi. She has no rales. Abdominal: Soft. Bowel sounds are normal. There is no hepatosplenomegaly. There is no tenderness. Musculoskeletal: She exhibits no edema. Lumbar back: She exhibits tenderness. She exhibits no edema, no deformity and no spasm. Negative straight leg   Lymphadenopathy:     She has no cervical adenopathy. Neurological: She is alert. She has normal strength.  She unintentional computerized transcription errors may be present.

## 2018-11-27 ENCOUNTER — TELEPHONE (OUTPATIENT)
Dept: INTERNAL MEDICINE CLINIC | Age: 63
End: 2018-11-27

## 2018-11-27 ASSESSMENT — ENCOUNTER SYMPTOMS
SHORTNESS OF BREATH: 0
DIARRHEA: 0
BOWEL INCONTINENCE: 0
HOARSE VOICE: 0
CHEST TIGHTNESS: 0
WATER BRASH: 0
NAUSEA: 0
EYE REDNESS: 0
SORE THROAT: 0
GLOBUS SENSATION: 0
RHINORRHEA: 0
CONSTIPATION: 0
BELCHING: 0
WHEEZING: 0
STRIDOR: 0
VOMITING: 0
HEARTBURN: 0
ABDOMINAL PAIN: 0
CHOKING: 0
SINUS PRESSURE: 0
COUGH: 0

## 2018-12-04 ENCOUNTER — HOSPITAL ENCOUNTER (OUTPATIENT)
Dept: GENERAL RADIOLOGY | Age: 63
Discharge: HOME | End: 2018-12-04
Payer: COMMERCIAL

## 2018-12-04 DIAGNOSIS — M85.88 OSTEOPENIA OF LUMBAR SPINE: ICD-10-CM

## 2018-12-04 PROCEDURE — 77080 DXA BONE DENSITY AXIAL: CPT

## 2018-12-04 PROCEDURE — 77085 DXA BONE DENSITY AXL VRT FX: CPT

## 2018-12-25 DIAGNOSIS — F33.0 MILD EPISODE OF RECURRENT MAJOR DEPRESSIVE DISORDER (HCC): ICD-10-CM

## 2018-12-25 DIAGNOSIS — R41.3 MEMORY LOSS: ICD-10-CM

## 2018-12-26 RX ORDER — SERTRALINE HYDROCHLORIDE 25 MG/1
25 TABLET, FILM COATED ORAL DAILY
Qty: 30 TABLET | Refills: 3 | Status: SHIPPED | OUTPATIENT
Start: 2018-12-26 | End: 2019-05-06 | Stop reason: SDUPTHER

## 2019-01-11 DIAGNOSIS — K21.9 GASTROESOPHAGEAL REFLUX DISEASE WITHOUT ESOPHAGITIS: ICD-10-CM

## 2019-01-11 DIAGNOSIS — J30.1 CHRONIC ALLERGIC RHINITIS DUE TO POLLEN: ICD-10-CM

## 2019-01-11 RX ORDER — FLUTICASONE PROPIONATE 50 MCG
SPRAY, SUSPENSION (ML) NASAL
Qty: 1 BOTTLE | Refills: 3 | Status: SHIPPED | OUTPATIENT
Start: 2019-01-11 | End: 2019-08-20 | Stop reason: SDUPTHER

## 2019-01-22 RX ORDER — MULTIVITAMIN WITH FOLIC ACID 400 MCG
TABLET ORAL
Qty: 30 TABLET | Refills: 5 | Status: SHIPPED | OUTPATIENT
Start: 2019-01-22 | End: 2019-07-15 | Stop reason: SDUPTHER

## 2019-03-06 DIAGNOSIS — E55.9 VITAMIN D DEFICIENCY: ICD-10-CM

## 2019-03-06 RX ORDER — ERGOCALCIFEROL 1.25 MG/1
CAPSULE ORAL
Qty: 12 CAPSULE | Refills: 0 | Status: SHIPPED | OUTPATIENT
Start: 2019-03-06 | End: 2019-06-12 | Stop reason: SDUPTHER

## 2019-03-22 ENCOUNTER — OFFICE VISIT (OUTPATIENT)
Dept: INTERNAL MEDICINE CLINIC | Age: 64
End: 2019-03-22
Payer: COMMERCIAL

## 2019-03-22 VITALS
DIASTOLIC BLOOD PRESSURE: 76 MMHG | BODY MASS INDEX: 19.04 KG/M2 | SYSTOLIC BLOOD PRESSURE: 110 MMHG | OXYGEN SATURATION: 98 % | WEIGHT: 114.4 LBS | HEART RATE: 74 BPM

## 2019-03-22 DIAGNOSIS — E04.1 RIGHT THYROID NODULE: ICD-10-CM

## 2019-03-22 DIAGNOSIS — H04.129 DRY EYE: ICD-10-CM

## 2019-03-22 DIAGNOSIS — M81.0 AGE-RELATED OSTEOPOROSIS WITHOUT CURRENT PATHOLOGICAL FRACTURE: Primary | ICD-10-CM

## 2019-03-22 DIAGNOSIS — M94.0 COSTOCHONDRITIS: ICD-10-CM

## 2019-03-22 DIAGNOSIS — G43.009 MIGRAINE WITHOUT AURA AND WITHOUT STATUS MIGRAINOSUS, NOT INTRACTABLE: ICD-10-CM

## 2019-03-22 DIAGNOSIS — R68.2 DRY MOUTH: ICD-10-CM

## 2019-03-22 LAB
SEDIMENTATION RATE, ERYTHROCYTE: 27 MM/HR (ref 0–30)
TSH REFLEX: 0.93 UIU/ML (ref 0.27–4.2)

## 2019-03-22 PROCEDURE — G8420 CALC BMI NORM PARAMETERS: HCPCS | Performed by: INTERNAL MEDICINE

## 2019-03-22 PROCEDURE — G8482 FLU IMMUNIZE ORDER/ADMIN: HCPCS | Performed by: INTERNAL MEDICINE

## 2019-03-22 PROCEDURE — G8427 DOCREV CUR MEDS BY ELIG CLIN: HCPCS | Performed by: INTERNAL MEDICINE

## 2019-03-22 PROCEDURE — 1036F TOBACCO NON-USER: CPT | Performed by: INTERNAL MEDICINE

## 2019-03-22 PROCEDURE — 3017F COLORECTAL CA SCREEN DOC REV: CPT | Performed by: INTERNAL MEDICINE

## 2019-03-22 PROCEDURE — 99214 OFFICE O/P EST MOD 30 MIN: CPT | Performed by: INTERNAL MEDICINE

## 2019-03-22 RX ORDER — ALENDRONATE SODIUM 70 MG/1
70 TABLET ORAL
Qty: 4 TABLET | Refills: 5 | Status: SHIPPED | OUTPATIENT
Start: 2019-03-22 | End: 2019-08-27 | Stop reason: SDUPTHER

## 2019-03-22 RX ORDER — SUMATRIPTAN 50 MG/1
50 TABLET, FILM COATED ORAL
Qty: 9 TABLET | Refills: 3 | Status: SHIPPED | OUTPATIENT
Start: 2019-03-22 | End: 2019-08-12 | Stop reason: SDUPTHER

## 2019-03-22 ASSESSMENT — ENCOUNTER SYMPTOMS
SORE THROAT: 0
ABDOMINAL PAIN: 0
WHEEZING: 0
DIARRHEA: 0
COUGH: 0
BACK PAIN: 0
EYE REDNESS: 0
NAUSEA: 0
RHINORRHEA: 0
CONSTIPATION: 0
CHEST TIGHTNESS: 0
VOMITING: 0
SINUS PRESSURE: 0
SHORTNESS OF BREATH: 0

## 2019-03-24 LAB — ANTI-NUCLEAR ANTIBODY (ANA): NEGATIVE

## 2019-03-25 ENCOUNTER — TELEPHONE (OUTPATIENT)
Dept: INTERNAL MEDICINE CLINIC | Age: 64
End: 2019-03-25

## 2019-03-25 NOTE — TELEPHONE ENCOUNTER
Received fax from [facility] [id number]. Needs Dr. Barajas Records signature. Placed in Dr. Barajas Records bin.

## 2019-03-28 ENCOUNTER — TELEPHONE (OUTPATIENT)
Dept: RHEUMATOLOGY | Age: 64
End: 2019-03-28

## 2019-04-02 ENCOUNTER — HOSPITAL ENCOUNTER (OUTPATIENT)
Dept: ULTRASOUND IMAGING | Age: 64
Discharge: HOME | End: 2019-04-02
Payer: COMMERCIAL

## 2019-04-02 DIAGNOSIS — E04.1 RIGHT THYROID NODULE: ICD-10-CM

## 2019-04-02 PROCEDURE — 76536 US EXAM OF HEAD AND NECK: CPT

## 2019-04-04 DIAGNOSIS — E04.1 RIGHT THYROID NODULE: Primary | ICD-10-CM

## 2019-04-23 ENCOUNTER — TELEPHONE (OUTPATIENT)
Dept: INTERNAL MEDICINE CLINIC | Age: 64
End: 2019-04-23

## 2019-04-30 ENCOUNTER — OFFICE VISIT (OUTPATIENT)
Dept: ENT CLINIC | Age: 64
End: 2019-04-30
Payer: COMMERCIAL

## 2019-04-30 VITALS — SYSTOLIC BLOOD PRESSURE: 120 MMHG | DIASTOLIC BLOOD PRESSURE: 62 MMHG | OXYGEN SATURATION: 98 % | HEART RATE: 70 BPM

## 2019-04-30 DIAGNOSIS — E04.1 THYROID NODULE: Primary | ICD-10-CM

## 2019-04-30 PROCEDURE — 3017F COLORECTAL CA SCREEN DOC REV: CPT | Performed by: OTOLARYNGOLOGY

## 2019-04-30 PROCEDURE — G8420 CALC BMI NORM PARAMETERS: HCPCS | Performed by: OTOLARYNGOLOGY

## 2019-04-30 PROCEDURE — 99203 OFFICE O/P NEW LOW 30 MIN: CPT | Performed by: OTOLARYNGOLOGY

## 2019-04-30 PROCEDURE — 1036F TOBACCO NON-USER: CPT | Performed by: OTOLARYNGOLOGY

## 2019-04-30 PROCEDURE — G8427 DOCREV CUR MEDS BY ELIG CLIN: HCPCS | Performed by: OTOLARYNGOLOGY

## 2019-04-30 ASSESSMENT — ENCOUNTER SYMPTOMS
ALLERGIC/IMMUNOLOGIC NEGATIVE: 1
RESPIRATORY NEGATIVE: 1
STRIDOR: 0
VOICE CHANGE: 0
EYES NEGATIVE: 1

## 2019-04-30 NOTE — PROGRESS NOTES
SUBJECTIVE:    Chief Complaint   Patient presents with    Other     check pt neck and thyroid, dry mouth. Sean Urbina is a 66 y.o. female    History and examination the patient had done with the assistance of an . Patient apparently had had recent thyroid sonogram which demonstrated a nodule on the left side for which she is here to determine the next possible therapeutic intervention. She's had no particular problems relative to difficulty in swallowing or breathing nor has she had no voice change of significance. She has been on no medications for this. She is on Flonase nasal spray for an apparent allergic sinusitis. He does relate that in [facility] in [place], 2 years ago, she did have a fine needle aspiration biopsy which she describes as being benign. She does not smoke. Past Medical History:   Diagnosis Date    Allergic rhinitis     Chronic allergic rhinitis due to pollen 1/19/2018    Dementia associated with other underlying disease without behavioral disturbance 7/27/2018    On Aricept     Gastroesophageal reflux disease without esophagitis 1/19/2018    Migraine without aura and without status migrainosus, not intractable 1/19/2018    Mild episode of recurrent major depressive disorder ([facility] [address] 1/19/2018    Vitamin D deficiency 2/28/2018      Past Surgical History:   Procedure Laterality Date    THROAT SURGERY  2017      Family History   Problem Relation Age of Onset    Diabetes Neg Hx     Heart Disease Neg Hx     Cancer Neg Hx       Social History     Tobacco Use    Smoking status: Never Smoker    Smokeless tobacco: Former User     Types: Chew   Substance Use Topics    Alcohol use: No        Review of Systems:  Review of Systems   Constitutional: Negative. Negative for fever and unexpected weight change. HENT: Negative. Negative for voice change. Eyes: Negative. Respiratory: Negative. Negative for stridor. Cardiovascular: Negative. Endocrine: Negative. Negative for cold intolerance and heat intolerance. Skin: Negative. Allergic/Immunologic: Negative. Neurological: Negative. Hematological: Negative. Psychiatric/Behavioral: Negative. OBJECTIVE:  /62   Pulse 70   SpO2 98%   Physical Exam   Constitutional: She is oriented to person, place, and time. She appears well-developed and well-nourished. HENT:   Head: Normocephalic and atraumatic. Right Ear: External ear normal.   Left Ear: External ear normal.   Nose: Nose normal.   Mouth/Throat: Oropharynx is clear and moist. No oropharyngeal exudate. Indirect laryngoscopy is entirely normal with normal vocal cord function. Eyes: Pupils are equal, round, and reactive to light. Conjunctivae and EOM are normal.   Neck: Normal range of motion. Neck supple. No tracheal deviation present. Thyromegaly (there is a 1 cm nodule located in the left thyroid lobe with non-tenderness and no evidence of adenopathy.) present. Cardiovascular: Normal rate and regular rhythm. Pulmonary/Chest: Effort normal.   Lymphadenopathy:     She has no cervical adenopathy. Neurological: She is alert and oriented to person, place, and time. Skin: Skin is warm and dry. Psychiatric: She has a normal mood and affect. Her behavior is normal. Judgment and thought content normal.        ASSESSMENT:    Left thyroid nodule. I reviewed her recent sonogram which indicated need for fine-needle aspiration of the nodule. PLAN:     Given the fact that she apparently had a fine-needle aspiration biopsy 2 years ago for the same area, I do believe that the records should be obtained before an attempt was made for another biopsy. Consequently, papers have been signed to obtain a release to obtain that information. If the result of the information is not available, then fine-needle aspiration biopsy will be ordered.     Berenice Mederos MD

## 2019-05-04 DIAGNOSIS — R41.3 MEMORY LOSS: ICD-10-CM

## 2019-05-04 DIAGNOSIS — F33.0 MILD EPISODE OF RECURRENT MAJOR DEPRESSIVE DISORDER (HCC): ICD-10-CM

## 2019-05-06 RX ORDER — SERTRALINE HYDROCHLORIDE 25 MG/1
25 TABLET, FILM COATED ORAL DAILY
Qty: 30 TABLET | Refills: 3 | Status: SHIPPED | OUTPATIENT
Start: 2019-05-06 | End: 2019-06-24 | Stop reason: DRUGHIGH

## 2019-05-07 ENCOUNTER — TELEPHONE (OUTPATIENT)
Dept: INTERNAL MEDICINE CLINIC | Age: 64
End: 2019-05-07

## 2019-05-22 ENCOUNTER — TELEPHONE (OUTPATIENT)
Dept: INTERNAL MEDICINE CLINIC | Age: 64
End: 2019-05-22

## 2019-05-22 NOTE — TELEPHONE ENCOUNTER
Received fax from [facility]. Needs Dr. Veronica Stoner signature. Placed in Dr. Veronica Stoner bin.

## 2019-05-23 DIAGNOSIS — E55.9 VITAMIN D DEFICIENCY: ICD-10-CM

## 2019-05-23 RX ORDER — B-COMPLEX WITH VITAMIN C
1 TABLET ORAL DAILY
Qty: 30 TABLET | Refills: 5 | Status: SHIPPED | OUTPATIENT
Start: 2019-05-23 | End: 2019-11-09 | Stop reason: SDUPTHER

## 2019-05-29 DIAGNOSIS — K21.9 GASTROESOPHAGEAL REFLUX DISEASE WITHOUT ESOPHAGITIS: ICD-10-CM

## 2019-05-30 RX ORDER — SUCRALFATE 1 G/10ML
SUSPENSION ORAL
Qty: 1200 ML | Refills: 0 | Status: SHIPPED | OUTPATIENT
Start: 2019-05-30 | End: 2019-09-13 | Stop reason: SDUPTHER

## 2019-06-06 ENCOUNTER — TELEPHONE (OUTPATIENT)
Dept: INTERNAL MEDICINE CLINIC | Age: 64
End: 2019-06-06

## 2019-06-06 NOTE — TELEPHONE ENCOUNTER
Received fax from  [facility] [place]. Needs Dr. Nica Pitt signature. Placed in Dr. Nica Pitt bin.

## 2019-06-12 DIAGNOSIS — E55.9 VITAMIN D DEFICIENCY: ICD-10-CM

## 2019-06-13 ENCOUNTER — TELEPHONE (OUTPATIENT)
Dept: INTERNAL MEDICINE CLINIC | Age: 64
End: 2019-06-13

## 2019-06-13 RX ORDER — ERGOCALCIFEROL 1.25 MG/1
CAPSULE ORAL
Qty: 12 CAPSULE | Refills: 0 | Status: SHIPPED | OUTPATIENT
Start: 2019-06-13 | End: 2019-09-01 | Stop reason: SDUPTHER

## 2019-06-24 ENCOUNTER — OFFICE VISIT (OUTPATIENT)
Dept: INTERNAL MEDICINE CLINIC | Age: 64
End: 2019-06-24
Payer: COMMERCIAL

## 2019-06-24 VITALS
BODY MASS INDEX: 18.97 KG/M2 | DIASTOLIC BLOOD PRESSURE: 62 MMHG | SYSTOLIC BLOOD PRESSURE: 98 MMHG | OXYGEN SATURATION: 98 % | HEART RATE: 97 BPM | WEIGHT: 114 LBS

## 2019-06-24 DIAGNOSIS — F02.80 ALZHEIMER'S DEMENTIA WITHOUT BEHAVIORAL DISTURBANCE, UNSPECIFIED TIMING OF DEMENTIA ONSET: ICD-10-CM

## 2019-06-24 DIAGNOSIS — K30 FUNCTIONAL DYSPEPSIA: ICD-10-CM

## 2019-06-24 DIAGNOSIS — G43.009 MIGRAINE WITHOUT AURA AND WITHOUT STATUS MIGRAINOSUS, NOT INTRACTABLE: ICD-10-CM

## 2019-06-24 DIAGNOSIS — G30.9 ALZHEIMER'S DEMENTIA WITHOUT BEHAVIORAL DISTURBANCE, UNSPECIFIED TIMING OF DEMENTIA ONSET: ICD-10-CM

## 2019-06-24 DIAGNOSIS — M94.0 COSTOCHONDRITIS: ICD-10-CM

## 2019-06-24 DIAGNOSIS — K21.9 GASTROESOPHAGEAL REFLUX DISEASE WITHOUT ESOPHAGITIS: ICD-10-CM

## 2019-06-24 DIAGNOSIS — G47.00 INSOMNIA, UNSPECIFIED TYPE: Primary | ICD-10-CM

## 2019-06-24 PROCEDURE — 99214 OFFICE O/P EST MOD 30 MIN: CPT | Performed by: INTERNAL MEDICINE

## 2019-06-24 PROCEDURE — 3017F COLORECTAL CA SCREEN DOC REV: CPT | Performed by: INTERNAL MEDICINE

## 2019-06-24 PROCEDURE — G8427 DOCREV CUR MEDS BY ELIG CLIN: HCPCS | Performed by: INTERNAL MEDICINE

## 2019-06-24 PROCEDURE — 1036F TOBACCO NON-USER: CPT | Performed by: INTERNAL MEDICINE

## 2019-06-24 PROCEDURE — G8420 CALC BMI NORM PARAMETERS: HCPCS | Performed by: INTERNAL MEDICINE

## 2019-06-24 RX ORDER — SUCRALFATE ORAL 1 G/10ML
SUSPENSION ORAL
Qty: 1200 ML | Refills: 0 | Status: CANCELLED | OUTPATIENT
Start: 2019-06-24

## 2019-06-24 RX ORDER — CARBOXYMETHYLCELLULOSE SODIUM 5 MG/ML
1 SOLUTION/ DROPS OPHTHALMIC 4 TIMES DAILY
Qty: 1 BOTTLE | Refills: 5 | Status: SHIPPED | OUTPATIENT
Start: 2019-06-24 | End: 2020-02-27 | Stop reason: SDUPTHER

## 2019-06-24 RX ORDER — LIDOCAINE 50 MG/G
1 PATCH TOPICAL DAILY
Qty: 30 PATCH | Refills: 0 | Status: SHIPPED | OUTPATIENT
Start: 2019-06-24 | End: 2020-06-05 | Stop reason: SDUPTHER

## 2019-06-24 RX ORDER — DONEPEZIL HYDROCHLORIDE 5 MG/1
5 TABLET, FILM COATED ORAL NIGHTLY
Qty: 30 TABLET | Refills: 3 | Status: SHIPPED | OUTPATIENT
Start: 2019-06-24 | End: 2019-10-09 | Stop reason: SDUPTHER

## 2019-06-24 RX ORDER — GREEN TEA/HOODIA GORDONII 315-12.5MG
1 CAPSULE ORAL DAILY
Qty: 30 TABLET | Refills: 3 | Status: SHIPPED | OUTPATIENT
Start: 2019-06-24 | End: 2019-11-16 | Stop reason: SDUPTHER

## 2019-06-24 RX ORDER — PANTOPRAZOLE SODIUM 40 MG/1
40 TABLET, DELAYED RELEASE ORAL DAILY
Qty: 30 TABLET | Refills: 3 | Status: SHIPPED | OUTPATIENT
Start: 2019-06-24 | End: 2019-10-09 | Stop reason: SDUPTHER

## 2019-06-24 RX ORDER — ACETAMINOPHEN 500 MG
500 TABLET ORAL EVERY 6 HOURS PRN
Qty: 120 TABLET | Refills: 3 | Status: SHIPPED | OUTPATIENT
Start: 2019-06-24 | End: 2021-01-06 | Stop reason: SDUPTHER

## 2019-06-24 ASSESSMENT — ENCOUNTER SYMPTOMS
NAUSEA: 0
RHINORRHEA: 0
SHORTNESS OF BREATH: 0
BACK PAIN: 1
ABDOMINAL PAIN: 1
EYE WATERING: 0
CONSTIPATION: 0
CHEST TIGHTNESS: 0
BLURRED VISION: 0
WHEEZING: 0
EYE PAIN: 0
SINUS PRESSURE: 0
DIARRHEA: 0
VOMITING: 0
EYE REDNESS: 0
COUGH: 0
SORE THROAT: 0

## 2019-06-24 NOTE — PROGRESS NOTES
[address]  Phone: [phone]           Patient Name: Geeta Wells    YOB: 1955    Today's Date: 6/24/19           Chief Complaint   Patient presents with    Headache    Thyroid Problem          Subjective: Tajik  assisted with visit   Headache- started 3 years ago. She was living in [place] at that time. Prior to 3 years ago, she had no headaches  Arrived in [address] in 2010  She is happy whenever she is with her family  She did get prescribed antibiotics at that time   Gets dizzy     Diet  Eats twice per day  Sometimes eats meat- mutton. Sometimes fish  Veggies-beans, lettuce, potatoes, tomatoes. Eats green veggies daily     Hydration- Drinks water 5-6 times per day     Sleep- She cannot get sleep well. Gets 5 hours per night. On sertraline. Not helping. She lays down at 8, sleep onset in 4 hours. She stays asleep   Difficulty sleeping for 3-4 years     Exercise- does some in the house. She does not go outside    Migraine    This is a chronic problem. The current episode started more than 1 year ago. The problem occurs daily. The problem has been unchanged. The quality of the pain is described as aching. Associated symptoms include abdominal pain and back pain. Pertinent negatives include no abnormal behavior, anorexia, blurred vision, coughing, dizziness, drainage, ear pain, eye pain, eye redness, eye watering, fever, hearing loss, nausea, rhinorrhea, sinus pressure, sore throat, tinnitus or vomiting. Nothing aggravates the symptoms. She has tried acetaminophen for the symptoms. The treatment provided no relief.        History:     Past Medical History:   Diagnosis Date    Allergic rhinitis     Chronic allergic rhinitis due to pollen 1/19/2018    Dementia associated with other underlying disease without behavioral disturbance 7/27/2018    On Aricept     Gastroesophageal reflux disease without esophagitis 1/19/2018    Migraine without aura and without status migrainosus, not intractable 1/19/2018    Mild episode of recurrent major depressive disorder ([facility] [address] 1/19/2018    Vitamin D deficiency 2/28/2018       Current Outpatient Medications on File Prior to Visit   Medication Sig Dispense Refill    vitamin D (ERGOCALCIFEROL) 40245 units CAPS capsule TAKE ONE CAPSULE BY MOUTH ONE TIME PER WEEK 12 capsule 0    CARAFATE 1 GM/10ML suspension TAKE 10 MLS BY MOUTH NIGHTLY FOR CHEST PAIN/GASTRIC PAIN 1200 mL 0    Calcium Carbonate-Vitamin D (OYSTER SHELL CALCIUM/D) 500-200 MG-UNIT TABS TAKE 1 TABLET BY MOUTH DAILY FOR BONE HEALTH 30 tablet 5    alendronate (FOSAMAX) 70 MG tablet Take 1 tablet by mouth every 7 days 4 tablet 5    SUMAtriptan (IMITREX) 50 MG tablet Take 1 tablet by mouth once as needed for Migraine May repeat in two hours if no improvement. Max two tabs a day. For headache 9 tablet 3    Multiple Vitamin (DAILY-PENNY) TABS TAKE 1 TABLET BY MOUTH EVERY DAY 30 tablet 5    fluticasone (FLONASE) 50 MCG/ACT nasal spray 1 SPRAY BY NASAL ROUTE DAILY 1 Bottle 3     No current facility-administered medications on file prior to visit. Social History     Tobacco Use    Smoking status: Never Smoker    Smokeless tobacco: Former User     Types: Chew   Substance Use Topics    Alcohol use: No         Review of Systems:    Review of Systems   Constitutional: Negative for fatigue and fever. HENT: Negative for ear pain, hearing loss, postnasal drip, rhinorrhea, sinus pressure, sore throat and tinnitus. Eyes: Negative for blurred vision, pain and redness. Respiratory: Negative for cough, chest tightness, shortness of breath and wheezing. Cardiovascular: Negative for chest pain, palpitations and leg swelling. Gastrointestinal: Positive for abdominal pain. Negative for anorexia, constipation, diarrhea, nausea and vomiting. Genitourinary: Negative for dysuria and frequency.    Musculoskeletal: Positive for back pain. Negative for arthralgias and joint swelling. Skin: Negative for rash. Neurological: Negative for dizziness, syncope and headaches. Objective:    Vitals:    06/24/19 0902   BP: 98/62   Pulse: 97   SpO2: 98%   Weight: 114 lb (51.7 kg)     Wt Readings from Last 3 Encounters:   06/24/19 114 lb (51.7 kg)   03/22/19 114 lb 6.4 oz (51.9 kg)   11/26/18 114 lb (51.7 kg)       Body mass index is 18.97 kg/m². Physical Exam   Constitutional: She appears well-developed and well-nourished. She does not have a sickly appearance. HENT:   Head: Atraumatic. Right Ear: Hearing, tympanic membrane, external ear and ear canal normal.   Left Ear: Hearing, tympanic membrane, external ear and ear canal normal.   Nose: Nose normal. No mucosal edema or rhinorrhea. Eyes: Pupils are equal, round, and reactive to light. No scleral icterus. Neck: Trachea normal. No thyroid mass and no thyromegaly present. Cardiovascular: Normal rate, regular rhythm, S1 normal, S2 normal, normal heart sounds and intact distal pulses. No murmur heard. Pulmonary/Chest: Effort normal and breath sounds normal. No respiratory distress. She has no wheezes. She has no rhonchi. She has no rales. Abdominal: Soft. Bowel sounds are normal. There is no hepatosplenomegaly. There is no tenderness. Musculoskeletal: She exhibits no edema. Lymphadenopathy:     She has no cervical adenopathy. Neurological: She is alert. No cranial nerve deficit. She exhibits normal muscle tone. Gait normal.   Skin: Skin is warm and dry. No rash noted. No evidence of acute ischemic insult, acute intracranial hemorrhage, or mass   lesion.       Minimal cerebral white matter disease, most likely chronic microvascular   ischemic change.           Assessment:    1. Migraine without aura and without status migrainosus, not intractable  Will work on sleep to see if this helps headaches. Increase Zoloft  Start melatonin     2.  Alzheimer's dementia without behavioral disturbance, unspecified timing of dementia onset  Refer for cognitive testing   - donepezil (ARICEPT) 5 MG tablet; Take 1 tablet by mouth nightly For dementia  Dispense: 30 tablet; Refill: 3  - Ambulatory referral to Psychology    3. Costochondritis    - diclofenac sodium 1 % GEL; Apply 2 g topically 2 times daily For chest wall pain  Dispense: 1 Tube; Refill: 3    4. Gastroesophageal reflux disease without esophagitis  Stop carafate  Goal will be to wean off PPI     5. Insomnia, unspecified type  Start melatonin   - Melatonin 5 MG CAPS; Take 5 mg by mouth nightly Take 30 minutes before bed  Dispense: 30 capsule; Refill: 3    6. Functional dyspepsia  Start probiotic   - Lactobacillus (PROBIOTIC ACIDOPHILUS) TABS; Take 1 tablet by mouth daily  Dispense: 30 tablet; Refill: 3        Plan/Patient Instructions:    Patient Instructions   Go outside for a walk daily- with a family member   Increase Zoloft to 50 mg at bed time   Start Melatonin. Take it 30 minutes before bed     Dementia  Refer to psychology for memory testing       Abdominal pain  Continue Protonix  Stop the sucralfate   Start a probiotic          Return in about 2 months (around 8/24/2019) for Back pain, sleep issues, headache . [address]       Documentation was done using voice recognition dragon software. Every effort was made to ensure accuracy; however, inadvertent, unintentional computerized transcription errors may be present.

## 2019-06-24 NOTE — PATIENT INSTRUCTIONS
Go outside for a walk daily- with a family member   Increase Zoloft to 50 mg at bed time   Start Melatonin.  Take it 30 minutes before bed     Dementia  Refer to psychology for memory testing       Abdominal pain  Continue Protonix  Stop the sucralfate   Start a probiotic

## 2019-06-25 ENCOUNTER — OFFICE VISIT (OUTPATIENT)
Dept: PSYCHOLOGY | Age: 64
End: 2019-06-25
Payer: COMMERCIAL

## 2019-06-25 DIAGNOSIS — F33.1 MODERATE EPISODE OF RECURRENT MAJOR DEPRESSIVE DISORDER (HCC): Primary | ICD-10-CM

## 2019-06-25 PROCEDURE — 90791 PSYCH DIAGNOSTIC EVALUATION: CPT | Performed by: PSYCHOLOGIST

## 2019-06-25 NOTE — PROGRESS NOTES
Behavioral Health Consultation  Dereck Cuevas, Ph.D.  Psychologist  6/25/2019  12:33 PM      Time spent with Patient: 30 minutes  [facility]. Reason for Consult:    Chief Complaint   Patient presents with    Other     Referring Provider: No referring provider defined for this encounter. Pt provided informed consent for the behavioral health program. Discussed with patient model of service to include the limits of confidentiality (i.e. abuse reporting, suicide  intervention, etc.) and short-term intervention focused approach. Pt indicated understanding. Feedback given to PCP. S:  Pt seen per PCP re: memory loss    Pt seen for intake with an  who provided translation services and son. Pts son wanted to speak for her but Pt did a good job providing information. Pt met criteria for Major Depressive Disorder and specifically endorsed depressed mood, deactivation, anhedonia,  social isolation,  insomnia/hypersomnia, fatigue, psychomotor retardation, and poor concentration/focus. Pt reported that her depression began 3-4 years ago when she began having memory challenges. Pt noted that she has challenges remembering to turn off the oven, forgets information, and cannot go outside because she will get lost. Pt stated frustration over her memory challenges as she was able to pass the citizenship test on her own and was very proud of her prior learning and memory capabilities. Pt noted that her memory challenges and depression began around the time she started experiencing health challenges (e.g., migraines, heart attack). Exacerbating factors include inactivity and isolation. Pt was unable to determine mitigating factors. Focused intervention on psychoed re: depression, cycle of deactivation, and bx activation, Set related goals. Will complete memory testing at next visit.        O:  MSE:    Appearance    alert, cooperative, mild distress  Impulsive behavior No  Speech normal rate and normal volume  Mood    Depressed  Affect    depressed affect  Thought Content    cognitive distortions  Thought Process    linear and coherent  Associations    logical connections  Insight    Good  Judgment    Intact  Orientation    oriented to person, place, time, and general circumstances  Memory    recent and remote memory intact  Attention/Concentration    intact  Morbid ideation No  Suicide Assessment    no suicidal ideation    History:    Social History:   Social History     Socioeconomic History    Marital status:      Spouse name: Not on file    Number of children: 4    Years of education: Not on file    Highest education level: Not on file   Occupational History    Not on file   Social Needs    Financial resource strain: Not on file    Food insecurity:     Worry: Not on file     Inability: Not on file    Transportation needs:     Medical: Not on file     Non-medical: Not on file   Tobacco Use    Smoking status: Never Smoker    Smokeless tobacco: Former User     Types: Chew   Substance and Sexual Activity    Alcohol use: No    Drug use: No    Sexual activity: Not on file   Lifestyle    Physical activity:     Days per week: Not on file     Minutes per session: Not on file    Stress: Not on file   Relationships    Social connections:     Talks on phone: Not on file     Gets together: Not on file     Attends Scientologist service: Not on file     Active member of club or organization: Not on file     Attends meetings of clubs or organizations: Not on file     Relationship status: Not on file    Intimate partner violence:     Fear of current or ex partner: Not on file     Emotionally abused: Not on file     Physically abused: Not on file     Forced sexual activity: Not on file   Other Topics Concern    Not on file   Social History Narrative    Lives with son,      TOBACCO:   reports that she has never smoked.  She has quit using smokeless tobacco. Her smokeless tobacco

## 2019-07-08 ENCOUNTER — TELEPHONE (OUTPATIENT)
Dept: INTERNAL MEDICINE CLINIC | Age: 64
End: 2019-07-08

## 2019-07-08 NOTE — TELEPHONE ENCOUNTER
Received fax from [employer]. Needs Dr. Alvina Whittaker signature. Placed in Dr. Alvina Whittaker bin.

## 2019-07-16 RX ORDER — MULTIVITAMIN WITH FOLIC ACID 400 MCG
TABLET ORAL
Qty: 30 TABLET | Refills: 5 | Status: SHIPPED | OUTPATIENT
Start: 2019-07-16 | End: 2020-01-07

## 2019-07-17 ENCOUNTER — HOSPITAL ENCOUNTER (OUTPATIENT)
Dept: WOMENS IMAGING | Age: 64
Discharge: HOME | End: 2019-07-17
Payer: COMMERCIAL

## 2019-07-17 DIAGNOSIS — Z12.31 BREAST CANCER SCREENING BY MAMMOGRAM: ICD-10-CM

## 2019-07-17 PROCEDURE — 77067 SCR MAMMO BI INCL CAD: CPT

## 2019-07-18 ENCOUNTER — OFFICE VISIT (OUTPATIENT)
Dept: PSYCHOLOGY | Age: 64
End: 2019-07-18
Payer: COMMERCIAL

## 2019-07-18 DIAGNOSIS — F33.0 MILD EPISODE OF RECURRENT MAJOR DEPRESSIVE DISORDER (HCC): Primary | ICD-10-CM

## 2019-07-18 DIAGNOSIS — F02.80 DEMENTIA ASSOCIATED WITH OTHER UNDERLYING DISEASE WITHOUT BEHAVIORAL DISTURBANCE (HCC): ICD-10-CM

## 2019-07-18 PROCEDURE — 96132 NRPSYC TST EVAL PHYS/QHP 1ST: CPT | Performed by: PSYCHOLOGIST

## 2019-07-18 NOTE — PROGRESS NOTES
Behavioral Health Consultation  Eleonora Colmenares, Ph.D.  Psychologist  7/18/2019  11:34 AM      Time spent with Patient: 30 minutes  This is patient's second [facility] appointment. Reason for Consult:        Chief Complaint   Patient presents with    Other      Referring Provider: No referring provider defined for this encounter. Pt provided informed consent for the behavioral health program. Discussed with patient model of service to include the limits of confidentiality (i.e. abuse reporting, suicide  intervention, etc.) and short-term intervention focused approach. Pt indicated understanding. Feedback given to PCP. S:  Pt seen per PCP re: memory loss    Ms. Dann Jackson was seen for an evaluation with an  who provided translation services. Ms. Dann Jackson was administered the [place] Scale-Third Edition (Dov-3). The Chris Favre 3 is a nonverbal measure of attentional and neuropsychological abilities including nonverbal IQ, processing speed, and memory. The Dov-3 was designed for individuals who have limited verbal capacity as well as those who do not speak [place]. The administration of the Dov-3 is completely nonverbal and directions are given through standardized pantomime. The Dov-3 has demonstrated good content-, criterion-, and construct-related validity. Regarding content validity, item response theory was utilized and the result of these analyses indicated that the Dov-3 items measure the underlying construct dimensions of the intended subtest and are relatively free from influence of extraneous variables, including ethnic difference. Concerning criterion-related validity, the Dov-3 has demonstrated an excellent level of classification accuracy for identification of individuals with a cognitive delay (95.4) as well as good sensitivity (25-72.9) and specificity (97.5-99.4).  Regarding construct validity, the results of a confirmatory factor analysis showed that the to the development and maintenance of  depression (including biological, cognitive, behavioral, and environmental factors), Discussed and set plan for behavioral activation, Provided education, Discussed self-care (sleep, nutrition, rewarding activities, social support, exercise), Discussed potential barriers to change and Established rapport    Documentation was done using voice recognition dragon software. Every effort was made to ensure accuracy; however, inadvertent, unintentional computerized transcription errors may be present.

## 2019-08-06 ENCOUNTER — OFFICE VISIT (OUTPATIENT)
Dept: PSYCHOLOGY | Age: 64
End: 2019-08-06
Payer: COMMERCIAL

## 2019-08-06 DIAGNOSIS — F33.0 MILD EPISODE OF RECURRENT MAJOR DEPRESSIVE DISORDER (HCC): Primary | ICD-10-CM

## 2019-08-06 PROCEDURE — 90832 PSYTX W PT 30 MINUTES: CPT | Performed by: PSYCHOLOGIST

## 2019-08-06 NOTE — PROGRESS NOTES
healthy eating bxs (6 months)    Goal: Improve interpersonal interactions  Objective 1: Learn and utilize effective communication strategies (6 months)  Objective 2: Implement boundaries and engage in limit setting (6 months)    Pt interventions:  Discussed various factors related to the development and maintenance of  depression (including biological, cognitive, behavioral, and environmental factors), Discussed and set plan for behavioral activation, Provided education, Discussed self-care (sleep, nutrition, rewarding activities, social support, exercise), Discussed potential barriers to change and Established rapport    Documentation was done using voice recognition dragon software. Every effort was made to ensure accuracy; however, inadvertent, unintentional computerized transcription errors may be present.

## 2019-08-12 DIAGNOSIS — G43.009 MIGRAINE WITHOUT AURA AND WITHOUT STATUS MIGRAINOSUS, NOT INTRACTABLE: ICD-10-CM

## 2019-08-13 RX ORDER — SUMATRIPTAN 50 MG/1
TABLET, FILM COATED ORAL
Qty: 9 TABLET | Refills: 3 | Status: SHIPPED | OUTPATIENT
Start: 2019-08-13

## 2019-08-20 ENCOUNTER — OFFICE VISIT (OUTPATIENT)
Dept: INTERNAL MEDICINE CLINIC | Age: 64
End: 2019-08-20
Payer: COMMERCIAL

## 2019-08-20 VITALS
OXYGEN SATURATION: 98 % | WEIGHT: 114 LBS | DIASTOLIC BLOOD PRESSURE: 60 MMHG | HEART RATE: 104 BPM | SYSTOLIC BLOOD PRESSURE: 102 MMHG | BODY MASS INDEX: 18.97 KG/M2

## 2019-08-20 DIAGNOSIS — F02.80 ALZHEIMER'S DEMENTIA WITHOUT BEHAVIORAL DISTURBANCE, UNSPECIFIED TIMING OF DEMENTIA ONSET: ICD-10-CM

## 2019-08-20 DIAGNOSIS — G47.00 INSOMNIA, UNSPECIFIED TYPE: Primary | ICD-10-CM

## 2019-08-20 DIAGNOSIS — G30.9 ALZHEIMER'S DEMENTIA WITHOUT BEHAVIORAL DISTURBANCE, UNSPECIFIED TIMING OF DEMENTIA ONSET: ICD-10-CM

## 2019-08-20 DIAGNOSIS — G43.009 MIGRAINE WITHOUT AURA AND WITHOUT STATUS MIGRAINOSUS, NOT INTRACTABLE: ICD-10-CM

## 2019-08-20 PROCEDURE — 1036F TOBACCO NON-USER: CPT | Performed by: INTERNAL MEDICINE

## 2019-08-20 PROCEDURE — 3017F COLORECTAL CA SCREEN DOC REV: CPT | Performed by: INTERNAL MEDICINE

## 2019-08-20 PROCEDURE — 99214 OFFICE O/P EST MOD 30 MIN: CPT | Performed by: INTERNAL MEDICINE

## 2019-08-20 PROCEDURE — G8420 CALC BMI NORM PARAMETERS: HCPCS | Performed by: INTERNAL MEDICINE

## 2019-08-20 PROCEDURE — G8427 DOCREV CUR MEDS BY ELIG CLIN: HCPCS | Performed by: INTERNAL MEDICINE

## 2019-08-20 RX ORDER — FLUTICASONE PROPIONATE 50 MCG
1 SPRAY, SUSPENSION (ML) NASAL DAILY
Qty: 1 BOTTLE | Refills: 5 | Status: SHIPPED | OUTPATIENT
Start: 2019-08-20 | End: 2021-03-04 | Stop reason: SDUPTHER

## 2019-08-20 ASSESSMENT — ENCOUNTER SYMPTOMS
DIARRHEA: 0
COUGH: 0
BACK PAIN: 1
RHINORRHEA: 0
SHORTNESS OF BREATH: 0
NAUSEA: 0
SINUS PRESSURE: 0
EYE REDNESS: 0
ABDOMINAL PAIN: 0
SORE THROAT: 0
VOMITING: 0
WHEEZING: 0
CONSTIPATION: 0
CHEST TIGHTNESS: 0

## 2019-08-20 NOTE — PROGRESS NOTES
medications on file prior to visit. Social History     Tobacco Use    Smoking status: Never Smoker    Smokeless tobacco: Former User     Types: Chew   Substance Use Topics    Alcohol use: No         Review of Systems:    Review of Systems   Constitutional: Negative for fatigue and fever. HENT: Negative for ear pain, hearing loss, postnasal drip, rhinorrhea, sinus pressure, sore throat and tinnitus. Eyes: Negative for redness. Respiratory: Negative for cough, chest tightness, shortness of breath and wheezing. Cardiovascular: Negative for chest pain, palpitations and leg swelling. Gastrointestinal: Negative for abdominal pain, constipation, diarrhea, nausea and vomiting. Genitourinary: Negative for dysuria and frequency. Musculoskeletal: Positive for back pain. Negative for arthralgias and joint swelling. Skin: Negative for rash. Neurological: Positive for headaches. Negative for dizziness and syncope. Objective:    Vitals:    08/20/19 0849   BP: 102/60   Pulse: 104   SpO2: 98%   Weight: 114 lb (51.7 kg)     Wt Readings from Last 3 Encounters:   08/20/19 114 lb (51.7 kg)   06/24/19 114 lb (51.7 kg)   03/22/19 114 lb 6.4 oz (51.9 kg)       Body mass index is 18.97 kg/m². Physical Exam   Constitutional: She appears well-developed and well-nourished. She does not have a sickly appearance. HENT:   Head: Atraumatic. Right Ear: Hearing, tympanic membrane, external ear and ear canal normal.   Left Ear: Hearing, tympanic membrane, external ear and ear canal normal.   Nose: Nose normal. No mucosal edema or rhinorrhea. Eyes: Pupils are equal, round, and reactive to light. No scleral icterus. Neck: Trachea normal. Thyroid mass present. No thyromegaly present. Cardiovascular: Normal rate, regular rhythm, S1 normal, S2 normal, normal heart sounds and intact distal pulses. No murmur heard. Pulmonary/Chest: Effort normal and breath sounds normal. No respiratory distress.  She has

## 2019-08-22 ENCOUNTER — TELEPHONE (OUTPATIENT)
Dept: ORTHOPEDIC SURGERY | Age: 64
End: 2019-08-22

## 2019-08-27 DIAGNOSIS — M81.0 AGE-RELATED OSTEOPOROSIS WITHOUT CURRENT PATHOLOGICAL FRACTURE: ICD-10-CM

## 2019-08-28 RX ORDER — ALENDRONATE SODIUM 70 MG/1
TABLET ORAL
Qty: 4 TABLET | Refills: 5 | Status: SHIPPED | OUTPATIENT
Start: 2019-08-28 | End: 2020-02-09

## 2019-09-01 DIAGNOSIS — E55.9 VITAMIN D DEFICIENCY: ICD-10-CM

## 2019-09-04 RX ORDER — ERGOCALCIFEROL 1.25 MG/1
CAPSULE ORAL
Qty: 4 CAPSULE | Refills: 2 | Status: SHIPPED | OUTPATIENT
Start: 2019-09-04 | End: 2019-11-01 | Stop reason: SDUPTHER

## 2019-09-12 ENCOUNTER — TELEPHONE (OUTPATIENT)
Dept: INTERNAL MEDICINE CLINIC | Age: 64
End: 2019-09-12

## 2019-09-13 DIAGNOSIS — K21.9 GASTROESOPHAGEAL REFLUX DISEASE WITHOUT ESOPHAGITIS: ICD-10-CM

## 2019-09-13 RX ORDER — SUCRALFATE 1 G/10ML
SUSPENSION ORAL
Qty: 300 ML | Refills: 3 | Status: SHIPPED | OUTPATIENT
Start: 2019-09-13 | End: 2020-01-07

## 2019-10-09 DIAGNOSIS — G47.00 INSOMNIA, UNSPECIFIED TYPE: ICD-10-CM

## 2019-10-09 DIAGNOSIS — G30.9 ALZHEIMER'S DEMENTIA WITHOUT BEHAVIORAL DISTURBANCE, UNSPECIFIED TIMING OF DEMENTIA ONSET: ICD-10-CM

## 2019-10-09 DIAGNOSIS — F02.80 ALZHEIMER'S DEMENTIA WITHOUT BEHAVIORAL DISTURBANCE, UNSPECIFIED TIMING OF DEMENTIA ONSET: ICD-10-CM

## 2019-10-10 RX ORDER — DONEPEZIL HYDROCHLORIDE 5 MG/1
5 TABLET, FILM COATED ORAL NIGHTLY
Qty: 30 TABLET | Refills: 3 | Status: SHIPPED | OUTPATIENT
Start: 2019-10-10 | End: 2020-02-04

## 2019-10-10 RX ORDER — MULTIVITAMIN/IRON/FOLIC ACID 18MG-0.4MG
TABLET ORAL
Qty: 30 TABLET | Refills: 3 | Status: SHIPPED | OUTPATIENT
Start: 2019-10-10 | End: 2020-02-04

## 2019-10-10 RX ORDER — PANTOPRAZOLE SODIUM 40 MG/1
40 TABLET, DELAYED RELEASE ORAL DAILY
Qty: 30 TABLET | Refills: 3 | Status: SHIPPED | OUTPATIENT
Start: 2019-10-10 | End: 2020-02-04

## 2019-10-11 ENCOUNTER — TELEPHONE (OUTPATIENT)
Dept: INTERNAL MEDICINE CLINIC | Age: 64
End: 2019-10-11

## 2019-10-11 DIAGNOSIS — R35.0 INCREASED FREQUENCY OF URINATION: Primary | ICD-10-CM

## 2019-10-11 DIAGNOSIS — R10.9 ACUTE RIGHT FLANK PAIN: ICD-10-CM

## 2019-10-15 ENCOUNTER — TELEPHONE (OUTPATIENT)
Dept: INTERNAL MEDICINE CLINIC | Age: 64
End: 2019-10-15

## 2019-10-25 RX ORDER — NITROFURANTOIN 25; 75 MG/1; MG/1
100 CAPSULE ORAL 2 TIMES DAILY
Qty: 14 CAPSULE | Refills: 0 | Status: SHIPPED | OUTPATIENT
Start: 2019-10-25 | End: 2019-11-01

## 2019-10-29 ENCOUNTER — TELEPHONE (OUTPATIENT)
Dept: INTERNAL MEDICINE CLINIC | Age: 64
End: 2019-10-29

## 2019-11-01 DIAGNOSIS — E55.9 VITAMIN D DEFICIENCY: ICD-10-CM

## 2019-11-01 RX ORDER — ERGOCALCIFEROL 1.25 MG/1
CAPSULE ORAL
Qty: 4 CAPSULE | Refills: 2 | Status: SHIPPED | OUTPATIENT
Start: 2019-11-01 | End: 2020-02-13

## 2019-11-08 ENCOUNTER — APPOINTMENT (OUTPATIENT)
Dept: GENERAL RADIOLOGY | Age: 64
End: 2019-11-08
Payer: COMMERCIAL

## 2019-11-08 ENCOUNTER — HOSPITAL ENCOUNTER (EMERGENCY)
Age: 64
Discharge: HOME | End: 2019-11-08
Attending: EMERGENCY MEDICINE
Payer: COMMERCIAL

## 2019-11-08 VITALS
TEMPERATURE: 98.8 F | DIASTOLIC BLOOD PRESSURE: 58 MMHG | SYSTOLIC BLOOD PRESSURE: 104 MMHG | RESPIRATION RATE: 16 BRPM | HEART RATE: 100 BPM | OXYGEN SATURATION: 98 %

## 2019-11-08 DIAGNOSIS — M79.10 MYALGIA: Primary | ICD-10-CM

## 2019-11-08 LAB
A/G RATIO: 1.2 (ref 1.1–2.2)
ALBUMIN SERPL-MCNC: 4.8 G/DL (ref 3.4–5)
ALP BLD-CCNC: 170 U/L (ref 40–129)
ALT SERPL-CCNC: 34 U/L (ref 10–40)
ANION GAP SERPL CALCULATED.3IONS-SCNC: 13 MMOL/L (ref 3–16)
AST SERPL-CCNC: 82 U/L (ref 15–37)
BASOPHILS ABSOLUTE: 0 K/UL (ref 0–0.2)
BASOPHILS RELATIVE PERCENT: 0.3 %
BILIRUB SERPL-MCNC: 1 MG/DL (ref 0–1)
BILIRUBIN URINE: NEGATIVE
BLOOD, URINE: ABNORMAL
BUN BLDV-MCNC: 7 MG/DL (ref 7–20)
CALCIUM SERPL-MCNC: 9.9 MG/DL (ref 8.3–10.6)
CHLORIDE BLD-SCNC: 95 MMOL/L (ref 99–110)
CLARITY: ABNORMAL
CO2: 26 MMOL/L (ref 21–32)
COLOR: YELLOW
CREAT SERPL-MCNC: 0.8 MG/DL (ref 0.6–1.2)
EOSINOPHILS ABSOLUTE: 0.1 K/UL (ref 0–0.6)
EOSINOPHILS RELATIVE PERCENT: 1.3 %
EPITHELIAL CELLS, UA: 0 /HPF (ref 0–5)
GFR AFRICAN AMERICAN: >60
GFR NON-AFRICAN AMERICAN: >60
GLOBULIN: 4.1 G/DL
GLUCOSE BLD-MCNC: 103 MG/DL (ref 70–99)
GLUCOSE URINE: NEGATIVE MG/DL
HCT VFR BLD CALC: 41.6 % (ref 36–48)
HEMOGLOBIN: 13.6 G/DL (ref 12–16)
HYALINE CASTS: 0 /LPF (ref 0–8)
KETONES, URINE: NEGATIVE MG/DL
LEUKOCYTE ESTERASE, URINE: NEGATIVE
LYMPHOCYTES ABSOLUTE: 0.5 K/UL (ref 1–5.1)
LYMPHOCYTES RELATIVE PERCENT: 4.8 %
MCH RBC QN AUTO: 30.4 PG (ref 26–34)
MCHC RBC AUTO-ENTMCNC: 32.7 G/DL (ref 31–36)
MCV RBC AUTO: 93.1 FL (ref 80–100)
MICROSCOPIC EXAMINATION: YES
MONOCYTES ABSOLUTE: 0.3 K/UL (ref 0–1.3)
MONOCYTES RELATIVE PERCENT: 2.7 %
NEUTROPHILS ABSOLUTE: 8.9 K/UL (ref 1.7–7.7)
NEUTROPHILS RELATIVE PERCENT: 90.9 %
NITRITE, URINE: NEGATIVE
PDW BLD-RTO: 13.8 % (ref 12.4–15.4)
PH UA: 7 (ref 5–8)
PLATELET # BLD: 114 K/UL (ref 135–450)
PMV BLD AUTO: 11.1 FL (ref 5–10.5)
POTASSIUM REFLEX MAGNESIUM: 3.6 MMOL/L (ref 3.5–5.1)
PROTEIN UA: NEGATIVE MG/DL
RAPID INFLUENZA  B AGN: NEGATIVE
RAPID INFLUENZA A AGN: NEGATIVE
RBC # BLD: 4.47 M/UL (ref 4–5.2)
RBC UA: 1 /HPF (ref 0–4)
SODIUM BLD-SCNC: 134 MMOL/L (ref 136–145)
SPECIFIC GRAVITY UA: <1.005 (ref 1–1.03)
TOTAL PROTEIN: 8.9 G/DL (ref 6.4–8.2)
URINE REFLEX TO CULTURE: ABNORMAL
URINE TYPE: ABNORMAL
UROBILINOGEN, URINE: 0.2 E.U./DL
WBC # BLD: 9.8 K/UL (ref 4–11)
WBC UA: 1 /HPF (ref 0–5)

## 2019-11-08 PROCEDURE — 96365 THER/PROPH/DIAG IV INF INIT: CPT

## 2019-11-08 PROCEDURE — 71046 X-RAY EXAM CHEST 2 VIEWS: CPT

## 2019-11-08 PROCEDURE — 99283 EMERGENCY DEPT VISIT LOW MDM: CPT

## 2019-11-08 PROCEDURE — 85025 COMPLETE CBC W/AUTO DIFF WBC: CPT

## 2019-11-08 PROCEDURE — 87804 INFLUENZA ASSAY W/OPTIC: CPT

## 2019-11-08 PROCEDURE — 81001 URINALYSIS AUTO W/SCOPE: CPT

## 2019-11-08 PROCEDURE — 80053 COMPREHEN METABOLIC PANEL: CPT

## 2019-11-08 PROCEDURE — 6370000000 HC RX 637 (ALT 250 FOR IP): Performed by: EMERGENCY MEDICINE

## 2019-11-08 PROCEDURE — 2580000003 HC RX 258: Performed by: EMERGENCY MEDICINE

## 2019-11-08 RX ORDER — ACETAMINOPHEN 500 MG
1000 TABLET ORAL ONCE
Status: COMPLETED | OUTPATIENT
Start: 2019-11-08 | End: 2019-11-08

## 2019-11-08 RX ORDER — SODIUM CHLORIDE, SODIUM LACTATE, POTASSIUM CHLORIDE, CALCIUM CHLORIDE 600; 310; 30; 20 MG/100ML; MG/100ML; MG/100ML; MG/100ML
1000 INJECTION, SOLUTION INTRAVENOUS ONCE
Status: COMPLETED | OUTPATIENT
Start: 2019-11-08 | End: 2019-11-08

## 2019-11-08 RX ADMIN — ACETAMINOPHEN 1000 MG: 500 TABLET, FILM COATED ORAL at 19:21

## 2019-11-08 RX ADMIN — SODIUM CHLORIDE, POTASSIUM CHLORIDE, SODIUM LACTATE AND CALCIUM CHLORIDE 1000 ML: 600; 310; 30; 20 INJECTION, SOLUTION INTRAVENOUS at 17:54

## 2019-11-08 ASSESSMENT — PAIN SCALES - GENERAL
PAINLEVEL_OUTOF10: 10
PAINLEVEL_OUTOF10: 10

## 2019-11-08 ASSESSMENT — PAIN DESCRIPTION - PAIN TYPE: TYPE: ACUTE PAIN

## 2019-11-09 DIAGNOSIS — E55.9 VITAMIN D DEFICIENCY: ICD-10-CM

## 2019-11-11 RX ORDER — B-COMPLEX WITH VITAMIN C
1 TABLET ORAL DAILY
Qty: 30 TABLET | Refills: 5 | Status: SHIPPED | OUTPATIENT
Start: 2019-11-11 | End: 2020-04-27

## 2019-11-13 ENCOUNTER — HOSPITAL ENCOUNTER (EMERGENCY)
Age: 64
Discharge: HOME | End: 2019-11-13
Payer: COMMERCIAL

## 2019-11-13 ENCOUNTER — APPOINTMENT (OUTPATIENT)
Dept: GENERAL RADIOLOGY | Age: 64
End: 2019-11-13
Payer: COMMERCIAL

## 2019-11-13 VITALS
BODY MASS INDEX: 18.97 KG/M2 | OXYGEN SATURATION: 100 % | WEIGHT: 114 LBS | TEMPERATURE: 97.7 F | DIASTOLIC BLOOD PRESSURE: 80 MMHG | SYSTOLIC BLOOD PRESSURE: 132 MMHG | HEART RATE: 74 BPM | RESPIRATION RATE: 22 BRPM

## 2019-11-13 DIAGNOSIS — K13.0 ANGULAR CHEILITIS: Primary | ICD-10-CM

## 2019-11-13 PROCEDURE — 6370000000 HC RX 637 (ALT 250 FOR IP): Performed by: NURSE PRACTITIONER

## 2019-11-13 PROCEDURE — 99283 EMERGENCY DEPT VISIT LOW MDM: CPT

## 2019-11-13 PROCEDURE — 71045 X-RAY EXAM CHEST 1 VIEW: CPT

## 2019-11-13 RX ORDER — ACETAMINOPHEN 500 MG
1000 TABLET ORAL ONCE
Status: COMPLETED | OUTPATIENT
Start: 2019-11-13 | End: 2019-11-13

## 2019-11-13 RX ADMIN — ACETAMINOPHEN 1000 MG: 500 TABLET, FILM COATED ORAL at 11:03

## 2019-11-13 ASSESSMENT — ENCOUNTER SYMPTOMS
CONSTIPATION: 0
BLOOD IN STOOL: 0
SORE THROAT: 0
NAUSEA: 0
DIARRHEA: 0
COUGH: 1
VOMITING: 0
SHORTNESS OF BREATH: 0
ABDOMINAL PAIN: 0
RHINORRHEA: 0

## 2019-11-13 ASSESSMENT — PAIN SCALES - GENERAL: PAINLEVEL_OUTOF10: 10

## 2019-11-13 ASSESSMENT — PAIN DESCRIPTION - PAIN TYPE: TYPE: ACUTE PAIN

## 2019-11-13 ASSESSMENT — PAIN DESCRIPTION - LOCATION: LOCATION: EAR

## 2019-11-16 DIAGNOSIS — K30 FUNCTIONAL DYSPEPSIA: ICD-10-CM

## 2019-11-18 RX ORDER — L. ACIDOPHILUS/PECTIN, CITRUS 25MM-100MG
TABLET ORAL
Qty: 30 TABLET | Refills: 3 | Status: SHIPPED | OUTPATIENT
Start: 2019-11-18 | End: 2020-03-06

## 2019-11-21 ENCOUNTER — OFFICE VISIT (OUTPATIENT)
Dept: INTERNAL MEDICINE CLINIC | Age: 64
End: 2019-11-21
Payer: COMMERCIAL

## 2019-11-21 VITALS
OXYGEN SATURATION: 97 % | BODY MASS INDEX: 18.66 KG/M2 | WEIGHT: 112 LBS | DIASTOLIC BLOOD PRESSURE: 60 MMHG | TEMPERATURE: 97 F | HEART RATE: 78 BPM | HEIGHT: 65 IN | SYSTOLIC BLOOD PRESSURE: 104 MMHG

## 2019-11-21 DIAGNOSIS — K13.0 ANGULAR CHEILITIS DUE TO BACTERIAL INFECTION: ICD-10-CM

## 2019-11-21 DIAGNOSIS — A49.9 ANGULAR CHEILITIS DUE TO BACTERIAL INFECTION: ICD-10-CM

## 2019-11-21 DIAGNOSIS — F02.80 DEMENTIA ASSOCIATED WITH OTHER UNDERLYING DISEASE WITHOUT BEHAVIORAL DISTURBANCE (HCC): ICD-10-CM

## 2019-11-21 DIAGNOSIS — H66.001 NON-RECURRENT ACUTE SUPPURATIVE OTITIS MEDIA OF RIGHT EAR WITHOUT SPONTANEOUS RUPTURE OF TYMPANIC MEMBRANE: Primary | ICD-10-CM

## 2019-11-21 PROCEDURE — 3017F COLORECTAL CA SCREEN DOC REV: CPT | Performed by: INTERNAL MEDICINE

## 2019-11-21 PROCEDURE — 1036F TOBACCO NON-USER: CPT | Performed by: INTERNAL MEDICINE

## 2019-11-21 PROCEDURE — G8482 FLU IMMUNIZE ORDER/ADMIN: HCPCS | Performed by: INTERNAL MEDICINE

## 2019-11-21 PROCEDURE — G8420 CALC BMI NORM PARAMETERS: HCPCS | Performed by: INTERNAL MEDICINE

## 2019-11-21 PROCEDURE — G8427 DOCREV CUR MEDS BY ELIG CLIN: HCPCS | Performed by: INTERNAL MEDICINE

## 2019-11-21 PROCEDURE — 99214 OFFICE O/P EST MOD 30 MIN: CPT | Performed by: INTERNAL MEDICINE

## 2019-11-21 RX ORDER — PSEUDOEPHEDRINE HCL 60 MG/1
60 TABLET ORAL 2 TIMES DAILY
Qty: 20 TABLET | Refills: 0 | Status: SHIPPED | OUTPATIENT
Start: 2019-11-21 | End: 2020-11-20

## 2019-11-21 RX ORDER — AMOXICILLIN AND CLAVULANATE POTASSIUM 875; 125 MG/1; MG/1
1 TABLET, FILM COATED ORAL 2 TIMES DAILY
Qty: 20 TABLET | Refills: 0 | Status: SHIPPED | OUTPATIENT
Start: 2019-11-21 | End: 2019-12-01

## 2019-11-21 ASSESSMENT — ENCOUNTER SYMPTOMS
WHEEZING: 0
EYES NEGATIVE: 1
COUGH: 1
RHINORRHEA: 0
SHORTNESS OF BREATH: 0
GASTROINTESTINAL NEGATIVE: 1
SINUS PAIN: 1

## 2019-11-22 ENCOUNTER — TELEPHONE (OUTPATIENT)
Dept: INTERNAL MEDICINE CLINIC | Age: 64
End: 2019-11-22

## 2019-12-03 ENCOUNTER — TELEPHONE (OUTPATIENT)
Dept: INTERNAL MEDICINE CLINIC | Age: 64
End: 2019-12-03

## 2020-01-02 ENCOUNTER — TELEPHONE (OUTPATIENT)
Dept: INTERNAL MEDICINE CLINIC | Age: 65
End: 2020-01-02

## 2020-01-07 RX ORDER — SUCRALFATE 1 G/10ML
SUSPENSION ORAL
Qty: 300 ML | Refills: 3 | Status: SHIPPED | OUTPATIENT
Start: 2020-01-07 | End: 2020-04-30

## 2020-01-07 RX ORDER — MULTIVITAMIN WITH FOLIC ACID 400 MCG
TABLET ORAL
Qty: 30 TABLET | Refills: 5 | Status: SHIPPED | OUTPATIENT
Start: 2020-01-07 | End: 2020-06-24

## 2020-02-04 RX ORDER — MULTIVITAMIN/IRON/FOLIC ACID 18MG-0.4MG
TABLET ORAL
Qty: 30 TABLET | Refills: 3 | Status: SHIPPED | OUTPATIENT
Start: 2020-02-04 | End: 2020-05-27

## 2020-02-04 RX ORDER — PANTOPRAZOLE SODIUM 40 MG/1
40 TABLET, DELAYED RELEASE ORAL DAILY
Qty: 30 TABLET | Refills: 3 | Status: SHIPPED | OUTPATIENT
Start: 2020-02-04 | End: 2020-05-27

## 2020-02-04 RX ORDER — DONEPEZIL HYDROCHLORIDE 5 MG/1
5 TABLET, FILM COATED ORAL NIGHTLY
Qty: 30 TABLET | Refills: 3 | Status: SHIPPED | OUTPATIENT
Start: 2020-02-04 | End: 2020-02-27

## 2020-02-09 RX ORDER — ALENDRONATE SODIUM 70 MG/1
TABLET ORAL
Qty: 4 TABLET | Refills: 5 | Status: SHIPPED | OUTPATIENT
Start: 2020-02-09 | End: 2020-07-14

## 2020-02-13 RX ORDER — ERGOCALCIFEROL 1.25 MG/1
CAPSULE ORAL
Qty: 4 CAPSULE | Refills: 2 | Status: SHIPPED | OUTPATIENT
Start: 2020-02-13 | End: 2020-04-28

## 2020-02-14 ENCOUNTER — OFFICE VISIT (OUTPATIENT)
Dept: PSYCHOLOGY | Age: 65
End: 2020-02-14
Payer: COMMERCIAL

## 2020-02-14 PROCEDURE — 90853 GROUP PSYCHOTHERAPY: CPT | Performed by: PSYCHOLOGIST

## 2020-02-20 NOTE — PROGRESS NOTES
Behavioral Health Consultation  Huong Wolf, Ph.D.  Psychologist  2/20/2020  9:35 AM      Time spent with Patient: 90 minutes   This is patient's second [facility] appointment. Reason for Consult:        Chief Complaint   Patient presents with    Other      Referring Provider: No referring provider defined for this encounter. Pt provided informed consent for the behavioral health program. Discussed with patient model of service to include the limits of confidentiality (i.e. abuse reporting, suicide  intervention, etc.) and short-term intervention focused approach. Pt indicated understanding. Feedback given to PCP. S:  Pt attended first visit of [address] [place] [address]. Provided psychoed on group structure, interpretation, and confidentiality. Introduced SUDS rating of mood (0 happy 10 distressed). Pt rated mood as 10 at the start of visit and 8 at the end of visit. Visit focused on mindfulness. Introduced passive and active mindfulness techniques (e.g., guided imagery, mindfulness of activity). Pt left visit with a painting set and the goal was set for Pt to paint each night for 5 minutes as a means of practicing mindfulness and increasing behavioral activation. Diagnosis:  Major Depressive Disorder moderate     Plan:    Goal: Enhance capacity to manage mood  Objective 1: Increase engagement in bx activation (6 months)  Objective 2: Utilize cognitive restructuring techniques to manage negative cognitions (6 months)  Objective 3: Increase engagement in self-care activities (6 months)    Goal: Improve healthy living bxs  Objective 1: Improve sleep hygiene practices (6 months)  Objective 2:  Increase activity and healthy eating bxs (6 months)    Goal: Improve interpersonal interactions  Objective 1: Learn and utilize effective communication strategies (6 months)  Objective 2: Implement boundaries and engage in limit setting (6 months)    Pt interventions:  Mindfulness; Behavioral Activation Documentation was done using voice recognition dragon software. Every effort was made to ensure accuracy; however, inadvertent, unintentional computerized transcription errors may be present.

## 2020-02-21 ENCOUNTER — OFFICE VISIT (OUTPATIENT)
Dept: PSYCHOLOGY | Age: 65
End: 2020-02-21
Payer: COMMERCIAL

## 2020-02-21 PROCEDURE — 90853 GROUP PSYCHOTHERAPY: CPT | Performed by: PSYCHOLOGIST

## 2020-02-21 NOTE — PROGRESS NOTES
Behavioral Health Consultation  Huong Wolf, Ph.D.  Psychologist  2/21/2020  10:00 AM    Time spent with Patient: 90 minutes   This is patient's second [facility] appointment. Reason for Consult:        Chief Complaint   Patient presents with    Other      Referring Provider: No referring provider defined for this encounter. Pt provided informed consent for the behavioral health program. Discussed with patient model of service to include the limits of confidentiality (i.e. abuse reporting, suicide  intervention, etc.) and short-term intervention focused approach. Pt indicated understanding. Feedback given to PCP. S:  Pt seen for second visit of [address] [place] [address]. Discussed SUDS rating of mood (0 happy 10 distressed). Pt rated mood as 10 at the start of visit and 8 at the end of visit. Visit focused on mindfulness. Continued to discuss mindfulness techniques. Focused particularly on using music for mindfulness, affective induction, and behavioral activation (movement). Discussed how to engage in such practice at home. Pt left visit with a made maraca to engage in mindfulness while listening to music at home and set a goal to dance to one song each day as a  means of increasing behavioral activation. Diagnosis:  Major Depressive Disorder moderate     Plan:    Goal: Enhance capacity to manage mood  Objective 1: Increase engagement in bx activation (6 months)  Objective 2: Utilize cognitive restructuring techniques to manage negative cognitions (6 months)  Objective 3: Increase engagement in self-care activities (6 months)    Goal: Improve healthy living bxs  Objective 1: Improve sleep hygiene practices (6 months)  Objective 2:  Increase activity and healthy eating bxs (6 months)    Goal: Improve interpersonal interactions  Objective 1: Learn and utilize effective communication strategies (6 months)  Objective 2: Implement boundaries and engage in limit setting (6 months)    Pt interventions:  Mindfulness; Behavioral Activation     Documentation was done using voice recognition dragon software. Every effort was made to ensure accuracy; however, inadvertent, unintentional computerized transcription errors may be present.

## 2020-02-27 ENCOUNTER — OFFICE VISIT (OUTPATIENT)
Dept: INTERNAL MEDICINE CLINIC | Age: 65
End: 2020-02-27
Payer: COMMERCIAL

## 2020-02-27 VITALS
SYSTOLIC BLOOD PRESSURE: 102 MMHG | BODY MASS INDEX: 18.8 KG/M2 | WEIGHT: 113 LBS | DIASTOLIC BLOOD PRESSURE: 60 MMHG | HEART RATE: 59 BPM | OXYGEN SATURATION: 96 %

## 2020-02-27 DIAGNOSIS — E78.5 DYSLIPIDEMIA: ICD-10-CM

## 2020-02-27 DIAGNOSIS — M81.0 AGE-RELATED OSTEOPOROSIS WITHOUT CURRENT PATHOLOGICAL FRACTURE: ICD-10-CM

## 2020-02-27 DIAGNOSIS — D69.6 THROMBOCYTOPENIA (HCC): ICD-10-CM

## 2020-02-27 PROBLEM — Z78.9 LANGUAGE BARRIER TO COMMUNICATION: Status: ACTIVE | Noted: 2020-02-27

## 2020-02-27 LAB
A/G RATIO: 1.6 (ref 1.1–2.2)
ALBUMIN SERPL-MCNC: 4.7 G/DL (ref 3.4–5)
ALP BLD-CCNC: 127 U/L (ref 40–129)
ALT SERPL-CCNC: 17 U/L (ref 10–40)
ANION GAP SERPL CALCULATED.3IONS-SCNC: 12 MMOL/L (ref 3–16)
AST SERPL-CCNC: 30 U/L (ref 15–37)
BASOPHILS ABSOLUTE: 0 K/UL (ref 0–0.2)
BASOPHILS RELATIVE PERCENT: 0.9 %
BILIRUB SERPL-MCNC: 0.8 MG/DL (ref 0–1)
BUN BLDV-MCNC: 5 MG/DL (ref 7–20)
CALCIUM SERPL-MCNC: 9.3 MG/DL (ref 8.3–10.6)
CHLORIDE BLD-SCNC: 102 MMOL/L (ref 99–110)
CHOLESTEROL, TOTAL: 196 MG/DL (ref 0–199)
CO2: 28 MMOL/L (ref 21–32)
CREAT SERPL-MCNC: 0.8 MG/DL (ref 0.6–1.2)
EOSINOPHILS ABSOLUTE: 0.1 K/UL (ref 0–0.6)
EOSINOPHILS RELATIVE PERCENT: 3.2 %
FOLATE: 6.78 NG/ML (ref 4.78–24.2)
GFR AFRICAN AMERICAN: >60
GFR NON-AFRICAN AMERICAN: >60
GLOBULIN: 2.9 G/DL
GLUCOSE BLD-MCNC: 56 MG/DL (ref 70–99)
HCT VFR BLD CALC: 39.2 % (ref 36–48)
HDLC SERPL-MCNC: 78 MG/DL (ref 40–60)
HEMOGLOBIN: 12.8 G/DL (ref 12–16)
LDL CHOLESTEROL CALCULATED: 83 MG/DL
LYMPHOCYTES ABSOLUTE: 1.8 K/UL (ref 1–5.1)
LYMPHOCYTES RELATIVE PERCENT: 46.6 %
MCH RBC QN AUTO: 30.4 PG (ref 26–34)
MCHC RBC AUTO-ENTMCNC: 32.6 G/DL (ref 31–36)
MCV RBC AUTO: 93.2 FL (ref 80–100)
MONOCYTES ABSOLUTE: 0.4 K/UL (ref 0–1.3)
MONOCYTES RELATIVE PERCENT: 10.4 %
NEUTROPHILS ABSOLUTE: 1.5 K/UL (ref 1.7–7.7)
NEUTROPHILS RELATIVE PERCENT: 38.9 %
PDW BLD-RTO: 13.8 % (ref 12.4–15.4)
PLATELET # BLD: 138 K/UL (ref 135–450)
PMV BLD AUTO: 11 FL (ref 5–10.5)
POTASSIUM SERPL-SCNC: 4.4 MMOL/L (ref 3.5–5.1)
RBC # BLD: 4.2 M/UL (ref 4–5.2)
SODIUM BLD-SCNC: 142 MMOL/L (ref 136–145)
TOTAL PROTEIN: 7.6 G/DL (ref 6.4–8.2)
TRIGL SERPL-MCNC: 177 MG/DL (ref 0–150)
VITAMIN B-12: 603 PG/ML (ref 211–911)
VITAMIN D 25-HYDROXY: 59.3 NG/ML
VLDLC SERPL CALC-MCNC: 35 MG/DL
WBC # BLD: 3.9 K/UL (ref 4–11)

## 2020-02-27 PROCEDURE — 1036F TOBACCO NON-USER: CPT | Performed by: INTERNAL MEDICINE

## 2020-02-27 PROCEDURE — G8482 FLU IMMUNIZE ORDER/ADMIN: HCPCS | Performed by: INTERNAL MEDICINE

## 2020-02-27 PROCEDURE — 99215 OFFICE O/P EST HI 40 MIN: CPT | Performed by: INTERNAL MEDICINE

## 2020-02-27 PROCEDURE — 3017F COLORECTAL CA SCREEN DOC REV: CPT | Performed by: INTERNAL MEDICINE

## 2020-02-27 PROCEDURE — G8420 CALC BMI NORM PARAMETERS: HCPCS | Performed by: INTERNAL MEDICINE

## 2020-02-27 PROCEDURE — G8427 DOCREV CUR MEDS BY ELIG CLIN: HCPCS | Performed by: INTERNAL MEDICINE

## 2020-02-27 RX ORDER — CARBOXYMETHYLCELLULOSE SODIUM 5 MG/ML
1 SOLUTION/ DROPS OPHTHALMIC 4 TIMES DAILY
Qty: 1 BOTTLE | Refills: 5 | Status: SHIPPED | OUTPATIENT
Start: 2020-02-27 | End: 2020-10-02 | Stop reason: SDUPTHER

## 2020-02-27 RX ORDER — DONEPEZIL HYDROCHLORIDE 10 MG/1
10 TABLET, FILM COATED ORAL NIGHTLY
Qty: 30 TABLET | Refills: 2 | Status: SHIPPED | OUTPATIENT
Start: 2020-02-27 | End: 2020-05-27

## 2020-02-27 ASSESSMENT — ENCOUNTER SYMPTOMS
COUGH: 0
SHORTNESS OF BREATH: 0
VOMITING: 0
SINUS PRESSURE: 0
NAUSEA: 0
ABDOMINAL PAIN: 0
CHEST TIGHTNESS: 0
EYE REDNESS: 0
WHEEZING: 0
DIARRHEA: 0
RHINORRHEA: 0
SORE THROAT: 0
CONSTIPATION: 0
BACK PAIN: 0

## 2020-02-27 NOTE — LETTER
[facility]  [address] [address] [address]  Phone: [phone]  Fax: [phone]        February 27, 2020      To whom it may concern,    Sergey Edgar has a history of Dementia and Depression. She is currently taking medication for both dementia and depression. She is followed by a psychologist for her depression. She requires assistance with bathing, grooming, and toileting. She also needs assistance with meal preparation, medication management, and house work. She currently lives with her  and son. Her  has multiple chronic conditions and unable to assist with activities of daily living or instrumental activities of daily living. Her son needs to work and commutes to [place], [place] to care for his immediate family there. Yessi Mitchell requires a minimum of 14 hours per week of in home assistance to safely remain in her home.        Sincerely,        Maribel Miller MD

## 2020-02-27 NOTE — PROGRESS NOTES
[address] [place]   Phone: [phone]           Patient Name: Serjio Search    YOB: 1955    Today's Date: 2/27/20           Chief Complaint   Patient presents with    Dementia          Subjective: Blanca  assisted with visit   Dementia: She is here for evaluation and treatment of cognitive problems. She is accompanied by patient and son. Primary caregiver is son. The family and the patient identify problems with changes in long term memory, changes in short term memory and getting disoriented outside of familiar environment. Family and patient report problems with none. Family and patient are concerned about  cooking, however, they are not concerned about medication errors and cooking. Medication administration: family monitors medication usage    Functional Assessment:   Activities of Daily Living (ADLs):    She is independent in the following: ambulation, feeding and dressing  Requires assistance with the following: bathing and hygiene, toileting. Patient remembers to go the bathroom but forgets to flush toilet, turn off sink  Instrumental Activities of Daily Living (IADLs):    She is independent in the following: nothing   Requires assistance with the following: cooking (she wants to, but forgets certain steps), medical appointments, taking medications.          History:     Past Medical History:   Diagnosis Date    Allergic rhinitis     Chronic allergic rhinitis due to pollen 1/19/2018    Dementia associated with other underlying disease without behavioral disturbance ([address] 7/27/2018    On Aricept     Gastroesophageal reflux disease without esophagitis 1/19/2018    Migraine without aura and without status migrainosus, not intractable 1/19/2018    Mild episode of recurrent major depressive disorder ([address] 1/19/2018    Vitamin D deficiency 2/28/2018       Current Outpatient Medications on File Prior to Visit   Medication Sig Dispense Refill    vitamin D (ERGOCALCIFEROL) 1.25 MG (18750 UT) CAPS capsule TAKE ONE CAPSULE BY MOUTH ONE TIME PER WEEK 4 capsule 2    alendronate (FOSAMAX) 70 MG tablet TAKE 1 TABLET BY MOUTH ONE TIME PER WEEK 4 tablet 5    [facility] MELATONIN 5 MG TABS tablet TAKE 1 TABLET BY MOUTH NIGHTLY 30 MINUTES BEFORE BED 30 tablet 3    pantoprazole (PROTONIX) 40 MG tablet TAKE 1 TABLET BY MOUTH DAILY FOR STOMACH PAIN 30 tablet 3    sertraline (ZOLOFT) 50 MG tablet TAKE 1 TABLET BY MOUTH EVERY DAY AT NIGHT 30 tablet 3    Multiple Vitamin (DAILY-PENNY) TABS TAKE 1 TABLET BY MOUTH EVERY DAY 30 tablet 5    CARAFATE 1 GM/10ML suspension TAKE 10 MLS BY MOUTH NIGHTLY FOR CHEST PAIN/GASTRIC PAIN 300 mL 3    Lactobacillus Acid-Pectin (ACIDOPHILUS/CITRUS PECTIN) TABS TAKE 1 TABLET BY MOUTH EVERY DAY 30 tablet 3    Calcium Carbonate-Vitamin D (OYSTER SHELL CALCIUM/D) 500-200 MG-UNIT TABS TAKE 1 TABLET BY MOUTH DAILY FOR BONE HEALTH 30 tablet 5    fluticasone (FLONASE) 50 MCG/ACT nasal spray 1 spray by Nasal route daily 1 Bottle 5    SUMAtriptan (IMITREX) 50 MG tablet TAKE 1 TABLET ONCE AS NEEDED FOR MIGRAINE MAY REPEAT IN TWO HRS IF NO IMPROVEMENT. MAX 2 TABS A DAY 9 tablet 3    diclofenac sodium 1 % GEL Apply 2 g topically 2 times daily For chest wall pain 1 Tube 3    acetaminophen (APAP EXTRA STRENGTH) 500 MG tablet Take 1 tablet by mouth every 6 hours as needed for Pain 120 tablet 3    pseudoephedrine (SUDAFED) 60 MG tablet Take 1 tablet by mouth 2 times daily (Patient not taking: Reported on 2/27/2020) 20 tablet 0    lidocaine (LIDODERM) 5 % Place 1 patch onto the skin daily 12 hours on, 12 hours off. (Patient not taking: Reported on 2/27/2020) 30 patch 0     No current facility-administered medications on file prior to visit. Social History     Tobacco Use    Smoking status: Never Smoker    Smokeless tobacco: Former User     Types: Chew   Substance Use Topics    Alcohol use:  No Review of Systems:    Review of Systems   Constitutional: Negative for fatigue and fever. HENT: Negative for ear pain, hearing loss, postnasal drip, rhinorrhea, sinus pressure, sore throat and tinnitus. Eyes: Negative for redness. Respiratory: Negative for cough, chest tightness, shortness of breath and wheezing. Cardiovascular: Negative for chest pain, palpitations and leg swelling. Gastrointestinal: Negative for abdominal pain, constipation, diarrhea, nausea and vomiting. Genitourinary: Negative for dysuria and frequency. Musculoskeletal: Negative for arthralgias, back pain and joint swelling. Skin: Negative for rash. Neurological: Negative for dizziness, syncope and headaches. Objective:    Vitals:    02/27/20 0855   BP: 102/60   Pulse: 59   SpO2: 96%   Weight: 113 lb (51.3 kg)     Wt Readings from Last 3 Encounters:   02/27/20 113 lb (51.3 kg)   11/21/19 112 lb (50.8 kg)   11/13/19 114 lb (51.7 kg)       Body mass index is 18.8 kg/m². Physical Exam  Constitutional:       Appearance: She is well-developed. HENT:      Head: Atraumatic. Right Ear: Hearing, tympanic membrane, ear canal and external ear normal.      Left Ear: Hearing, tympanic membrane, ear canal and external ear normal.      Nose: Nose normal. No mucosal edema or rhinorrhea. Eyes:      General: No scleral icterus. Pupils: Pupils are equal, round, and reactive to light. Neck:      Thyroid: No thyroid mass or thyromegaly. Trachea: Trachea normal.   Cardiovascular:      Rate and Rhythm: Normal rate and regular rhythm. Heart sounds: Normal heart sounds, S1 normal and S2 normal. No murmur. Pulmonary:      Effort: Pulmonary effort is normal. No respiratory distress. Breath sounds: Normal breath sounds. No wheezing, rhonchi or rales. Abdominal:      General: Bowel sounds are normal.      Palpations: Abdomen is soft. Tenderness: There is no abdominal tenderness. Lymphadenopathy:      Cervical: No cervical adenopathy. Skin:     General: Skin is warm and dry. Findings: No rash. Neurological:      Mental Status: She is alert. Cranial Nerves: No cranial nerve deficit. Motor: No abnormal muscle tone. Gait: Gait normal.           Assessment:    1. Alzheimer's dementia without behavioral disturbance, unspecified timing of dementia onset (HCC)  Unchanged. Recommend increasing Aricept. Family concerned she only received 7 hours of home health aide services weekly. This is challenging as son needs to work. Letter completed requesting a minimum of 14 hours. - donepezil (ARICEPT) 10 MG tablet; Take 1 tablet by mouth nightly For dementia  Dispense: 30 tablet; Refill: 2    2. Dyslipidemia  Due for fasting labs  - Lipid Panel; Future  - Comprehensive Metabolic Panel; Future    3. Age-related osteoporosis without current pathological fracture  Check vitamin D  - Vitamin D 25 Hydroxy; Future    4. Thrombocytopenia (HCC)  Check CBC, B12 and folate  - CBC Auto Differential; Future  - Vitamin B12 & Folate    5. Language barrier to communication   utilized        Plan/Patient Instructions:    Patient Instructions   Increase Aricept  Check fasting labs            Return in about 3 months (around 5/27/2020) for Depression, dizziness, dementia . [address]       Documentation was done using voice recognition dragon software. Every effort was made to ensure accuracy; however, inadvertent, unintentional computerized transcription errors may be present. 45 minutes spent with patient- >50 % continuity of care and/or counseling.

## 2020-02-29 ENCOUNTER — TELEPHONE (OUTPATIENT)
Dept: INTERNAL MEDICINE CLINIC | Age: 65
End: 2020-02-29

## 2020-02-29 NOTE — TELEPHONE ENCOUNTER
PA SUBMITTED VIA CMM FOR [facility] Lubricant Eye Drops PF 0.5% solution  Key: [id number] - Rx #: [id number]     WHAT DIAGNOSIS AND ICD 10 CODE WOULD YOU LIKE ME TO USE?

## 2020-03-06 RX ORDER — L. ACIDOPHILUS/PECTIN, CITRUS 25MM-100MG
TABLET ORAL
Qty: 30 TABLET | Refills: 3 | Status: SHIPPED | OUTPATIENT
Start: 2020-03-06 | End: 2020-06-24

## 2020-03-18 ENCOUNTER — HOSPITAL ENCOUNTER (EMERGENCY)
Age: 65
Discharge: HOME | End: 2020-03-18
Payer: COMMERCIAL

## 2020-03-18 VITALS
BODY MASS INDEX: 19.46 KG/M2 | HEART RATE: 89 BPM | DIASTOLIC BLOOD PRESSURE: 75 MMHG | SYSTOLIC BLOOD PRESSURE: 123 MMHG | WEIGHT: 114 LBS | OXYGEN SATURATION: 98 % | HEIGHT: 64 IN | RESPIRATION RATE: 16 BRPM | TEMPERATURE: 98.2 F

## 2020-03-18 PROCEDURE — 99282 EMERGENCY DEPT VISIT SF MDM: CPT

## 2020-03-18 PROCEDURE — 6370000000 HC RX 637 (ALT 250 FOR IP): Performed by: PHYSICIAN ASSISTANT

## 2020-03-18 RX ORDER — LIDOCAINE 50 MG/G
1 PATCH TOPICAL DAILY
Qty: 30 PATCH | Refills: 0 | Status: SHIPPED | OUTPATIENT
Start: 2020-03-18 | End: 2020-06-05

## 2020-03-18 RX ORDER — LIDOCAINE 4 G/G
1 PATCH TOPICAL ONCE
Status: DISCONTINUED | OUTPATIENT
Start: 2020-03-18 | End: 2020-03-18 | Stop reason: HOSPADM

## 2020-03-18 RX ORDER — ACETAMINOPHEN 500 MG
500 TABLET ORAL EVERY 6 HOURS PRN
Qty: 30 TABLET | Refills: 0 | Status: SHIPPED | OUTPATIENT
Start: 2020-03-18 | End: 2020-06-05

## 2020-03-18 RX ORDER — ACETAMINOPHEN 500 MG
1000 TABLET ORAL ONCE
Status: COMPLETED | OUTPATIENT
Start: 2020-03-18 | End: 2020-03-18

## 2020-03-18 RX ADMIN — ACETAMINOPHEN 1000 MG: 500 TABLET, FILM COATED ORAL at 17:15

## 2020-03-18 ASSESSMENT — ENCOUNTER SYMPTOMS
VOMITING: 0
SHORTNESS OF BREATH: 0
BACK PAIN: 1
NAUSEA: 0
DIARRHEA: 0

## 2020-03-18 ASSESSMENT — PAIN SCALES - GENERAL: PAINLEVEL_OUTOF10: 0

## 2020-03-18 NOTE — ED NOTES
Pt discharged in stable condition, VSS, no signs of distress. Discharge instructions and meds reviewed. Pt verbalizes understanding and states no further questions or concerns unaddressed.        Abner Fry RN  03/18/20 5939

## 2020-03-18 NOTE — ED PROVIDER NOTES
Review of Systems   Constitutional: Negative for activity change, appetite change, chills and fever. Respiratory: Negative for shortness of breath. Cardiovascular: Negative for chest pain. Gastrointestinal: Negative for diarrhea, nausea and vomiting. Genitourinary: Negative for difficulty urinating, dysuria and hematuria. Musculoskeletal: Positive for back pain. Neurological: Negative for weakness and numbness. Positives and Pertinent negatives as per HPI. Except as noted above in the ROS, all other systems were reviewed and negative.        PAST MEDICAL HISTORY     Past Medical History:   Diagnosis Date    Allergic rhinitis     Chronic allergic rhinitis due to pollen 1/19/2018    Dementia associated with other underlying disease without behavioral disturbance ([facility] 7/27/2018    On Aricept     Gastroesophageal reflux disease without esophagitis 1/19/2018    Migraine without aura and without status migrainosus, not intractable 1/19/2018    Mild episode of recurrent major depressive disorder ([facility] 1/19/2018    Vitamin D deficiency 2/28/2018         SURGICAL HISTORY     Past Surgical History:   Procedure Laterality Date    THROAT SURGERY  2017         [address]       Discharge Medication List as of 3/18/2020  5:18 PM      CONTINUE these medications which have NOT CHANGED    Details   Lactobacillus Acid-Pectin (ACIDOPHILUS/CITRUS PECTIN) TABS TAKE 1 TABLET BY MOUTH EVERY DAY, Disp-30 tablet, R-3Normal      donepezil (ARICEPT) 10 MG tablet Take 1 tablet by mouth nightly For dementia, Disp-30 tablet, R-2Normal      vitamin D (ERGOCALCIFEROL) 1.25 MG (79958 UT) CAPS capsule TAKE ONE CAPSULE BY MOUTH ONE TIME PER WEEK, Disp-4 capsule, R-2Normal      alendronate (FOSAMAX) 70 MG tablet TAKE 1 TABLET BY MOUTH ONE TIME PER WEEK, Disp-4 tablet, R-5Normal      [facility]S MELATONIN 5 MG TABS tablet TAKE 1 TABLET BY MOUTH NIGHTLY 30 MINUTES BEFORE BED, Disp-30 tablet, R-3Normal      pantoprazole No    Drug use: No       SCREENINGS             PHYSICAL EXAM    (up to 7 for level 4, 8 or more for level 5)     ED Triage Vitals [03/18/20 1637]   BP Temp Temp Source Pulse Resp SpO2 Height Weight   123/75 98.2 °F (36.8 °C) Oral 89 16 98 % 5' 4\" (1.626 m) 114 lb (51.7 kg)       Physical Exam  Vitals signs and nursing note reviewed. Constitutional:       Appearance: She is well-developed. She is not diaphoretic. HENT:      Head: Normocephalic and atraumatic. Right Ear: External ear normal.      Left Ear: External ear normal.      Nose: Nose normal.   Eyes:      General:         Right eye: No discharge. Left eye: No discharge. Neck:      Musculoskeletal: Normal range of motion and neck supple. Trachea: No tracheal deviation. Cardiovascular:      Pulses: Normal pulses. Pulmonary:      Effort: Pulmonary effort is normal. No respiratory distress. Musculoskeletal: Normal range of motion. Comments: There is tenderness palpation to the paraspinous muscles of the right lower lumbar spine, there is no midline tenderness. No bony step-offs or crepitus   Skin:     General: Skin is warm and dry. Neurological:      Mental Status: She is alert and oriented to person, place, and time. Comments: Motor strength of bilateral lower extremities is 5/5 throughout. Normal sensation light touch. Patellar reflexes and S1 reflexes are 2+ bilaterally. Posterior tibialis pulse and dorsalis pedis pulses are 2+ bilaterally. Negative straight leg raise. Patient is able to ambulate without difficulty. Normal dorsiflexion and plantar flexion. Full range of motion of hip, knee and ankle joints. Psychiatric:         Behavior: Behavior normal.         DIAGNOSTIC RESULTS   LABS:    Labs Reviewed - No data to display    All other labs were within normal range or not returned as of this dictation. EKG:  All EKG's are interpreted by the Emergency Department Physician in the absence of a numbness, tingling or weakness. No other complaints. On physical exam, the patient does have tenderness to the paraspinous muscles of the right lower lumbar spine. There are no neurological deficits. There are no red flags, and as she has had this pain which is been ongoing for some time I do not feel that she needs any further work-up at this time, and she otherwise has no complaints, I feel she can be managed as an outpatient. Patient will be given Tylenol and Lidoderm patches for home, she is to obtain follow-up with her primary care physician within 2 to 3 days reevaluation. She is to return to the ED for any new or worsening symptoms. Patient voiced understanding and agreed with plan. Stable for discharge. My suspicion is low at this time for compression fracture, cauda equina syndrome, epidural abscess, discitis, epidural hematoma or other emergent etiology      FINAL IMPRESSION      1. Strain of lumbar region, initial encounter          DISPOSITION/PLAN   DISPOSITION Decision To Discharge 03/18/2020 05:09:03 PM      PATIENT REFERREDTO:  Oli Monique MD  [address] [place] [address]  [phone]    Schedule an appointment as soon as possible for a visit in 2 days      [facility] Emergency Department  [address] [place] [address]  [phone]    As needed, If symptoms worsen      DISCHARGE MEDICATIONS:  Discharge Medication List as of 3/18/2020  5:18 PM      START taking these medications    Details   !! acetaminophen (APAP EXTRA STRENGTH) 500 MG tablet Take 1 tablet by mouth every 6 hours as needed for Pain, Disp-30 tablet, R-0Print      !! lidocaine (LIDODERM) 5 % Place 1 patch onto the skin daily 12 hours on, 12 hours off., Disp-30 patch, R-0Print       !! - Potential duplicate medications found. Please discuss with provider.           DISCONTINUED MEDICATIONS:  Discharge Medication List as of 3/18/2020  5:18 PM                 (Please note that portions of

## 2020-03-19 ENCOUNTER — TELEPHONE (OUTPATIENT)
Dept: INTERNAL MEDICINE CLINIC | Age: 65
End: 2020-03-19

## 2020-04-27 RX ORDER — B-COMPLEX WITH VITAMIN C
1 TABLET ORAL DAILY
Qty: 30 TABLET | Refills: 1 | Status: SHIPPED | OUTPATIENT
Start: 2020-04-27 | End: 2020-06-24

## 2020-04-28 RX ORDER — ERGOCALCIFEROL 1.25 MG/1
CAPSULE ORAL
Qty: 4 CAPSULE | Refills: 2 | Status: SHIPPED | OUTPATIENT
Start: 2020-04-28 | End: 2020-07-20

## 2020-04-30 ENCOUNTER — TELEPHONE (OUTPATIENT)
Dept: ADMINISTRATIVE | Age: 65
End: 2020-04-30

## 2020-04-30 RX ORDER — SUCRALFATE 1 G/10ML
SUSPENSION ORAL
Qty: 300 ML | Refills: 3 | Status: SHIPPED | OUTPATIENT
Start: 2020-04-30 | End: 2021-06-17 | Stop reason: SDUPTHER

## 2020-05-08 ENCOUNTER — TELEPHONE (OUTPATIENT)
Dept: INTERNAL MEDICINE CLINIC | Age: 65
End: 2020-05-08

## 2020-05-27 RX ORDER — PANTOPRAZOLE SODIUM 40 MG/1
40 TABLET, DELAYED RELEASE ORAL DAILY
Qty: 30 TABLET | Refills: 3 | Status: SHIPPED | OUTPATIENT
Start: 2020-05-27 | End: 2020-07-20 | Stop reason: SDUPTHER

## 2020-05-27 RX ORDER — MULTIVITAMIN/IRON/FOLIC ACID 18MG-0.4MG
TABLET ORAL
Qty: 30 TABLET | Refills: 3 | Status: SHIPPED | OUTPATIENT
Start: 2020-05-27 | End: 2020-09-28

## 2020-05-27 RX ORDER — DONEPEZIL HYDROCHLORIDE 10 MG/1
10 TABLET, FILM COATED ORAL NIGHTLY
Qty: 30 TABLET | Refills: 2 | Status: SHIPPED | OUTPATIENT
Start: 2020-05-27 | End: 2020-08-28

## 2020-06-05 ENCOUNTER — VIRTUAL VISIT (OUTPATIENT)
Dept: INTERNAL MEDICINE CLINIC | Age: 65
End: 2020-06-05
Payer: COMMERCIAL

## 2020-06-05 PROCEDURE — 3017F COLORECTAL CA SCREEN DOC REV: CPT | Performed by: INTERNAL MEDICINE

## 2020-06-05 PROCEDURE — 99213 OFFICE O/P EST LOW 20 MIN: CPT | Performed by: INTERNAL MEDICINE

## 2020-06-05 PROCEDURE — 1036F TOBACCO NON-USER: CPT | Performed by: INTERNAL MEDICINE

## 2020-06-05 PROCEDURE — G8427 DOCREV CUR MEDS BY ELIG CLIN: HCPCS | Performed by: INTERNAL MEDICINE

## 2020-06-05 PROCEDURE — G8420 CALC BMI NORM PARAMETERS: HCPCS | Performed by: INTERNAL MEDICINE

## 2020-06-05 RX ORDER — LIDOCAINE 50 MG/G
1 PATCH TOPICAL DAILY
Qty: 30 PATCH | Refills: 5 | Status: SHIPPED | OUTPATIENT
Start: 2020-06-05 | End: 2022-05-26 | Stop reason: SDUPTHER

## 2020-06-05 RX ORDER — CETIRIZINE HYDROCHLORIDE 10 MG/1
10 TABLET ORAL DAILY PRN
Qty: 30 TABLET | Refills: 5 | Status: SHIPPED | OUTPATIENT
Start: 2020-06-05 | End: 2021-01-06 | Stop reason: SDUPTHER

## 2020-06-05 ASSESSMENT — ENCOUNTER SYMPTOMS
BACK PAIN: 1
EYE ITCHING: 1

## 2020-06-05 NOTE — PROGRESS NOTES
TAKE 1 TABLET BY MOUTH DAILY FOR STOMACH PAIN 30 tablet 3    CARAFATE 1 GM/10ML suspension TAKE 10 MLS BY MOUTH NIGHTLY FOR CHEST PAIN/GASTRIC PAIN 300 mL 3    vitamin D (ERGOCALCIFEROL) 1.25 MG (21632 UT) CAPS capsule TAKE 1 CAPSULE BY MOUTH ONE TIME PER WEEK 4 capsule 2    Calcium Carbonate-Vitamin D (OYSTER SHELL CALCIUM/D) 500-200 MG-UNIT TABS TAKE 1 TABLET BY MOUTH DAILY FOR BONE HEALTH 30 tablet 1    Lactobacillus Acid-Pectin (ACIDOPHILUS/CITRUS PECTIN) TABS TAKE 1 TABLET BY MOUTH EVERY DAY 30 tablet 3    carboxymethylcellulose PF (REFRESH PLUS) 0.5 % SOLN ophthalmic solution Place 1 drop into both eyes 4 times daily 1 Bottle 5    alendronate (FOSAMAX) 70 MG tablet TAKE 1 TABLET BY MOUTH ONE TIME PER WEEK 4 tablet 5    Multiple Vitamin (DAILY-PENNY) TABS TAKE 1 TABLET BY MOUTH EVERY DAY 30 tablet 5    pseudoephedrine (SUDAFED) 60 MG tablet Take 1 tablet by mouth 2 times daily 20 tablet 0    fluticasone (FLONASE) 50 MCG/ACT nasal spray 1 spray by Nasal route daily 1 Bottle 5    SUMAtriptan (IMITREX) 50 MG tablet TAKE 1 TABLET ONCE AS NEEDED FOR MIGRAINE MAY REPEAT IN TWO HRS IF NO IMPROVEMENT. MAX 2 TABS A DAY 9 tablet 3    diclofenac sodium 1 % GEL Apply 2 g topically 2 times daily For chest wall pain 1 Tube 3    acetaminophen (APAP EXTRA STRENGTH) 500 MG tablet Take 1 tablet by mouth every 6 hours as needed for Pain 120 tablet 3     No current facility-administered medications on file prior to visit. Social History     Tobacco Use    Smoking status: Never Smoker    Smokeless tobacco: Former User     Types: Chew   Substance Use Topics    Alcohol use: No         Review of Systems:    Review of Systems   Constitutional: Negative. HENT: Negative. Eyes: Positive for itching. Musculoskeletal: Positive for back pain. Skin: Negative. Psychiatric/Behavioral: Negative. Objective: There were no vitals filed for this visit.   Wt Readings from Last 3 Encounters:   03/18/20 114 lb patient's risk of exposure to COVID-19 and provide continuity of care for an established patient. Services were provided through a video synchronous discussion virtually to substitute for in-person clinic visit.

## 2020-06-18 ENCOUNTER — TELEPHONE (OUTPATIENT)
Dept: INTERNAL MEDICINE CLINIC | Age: 65
End: 2020-06-18

## 2020-06-18 NOTE — TELEPHONE ENCOUNTER
[facility] called requesting verbal orders for home health aide. Verbal orders given.

## 2020-06-22 ENCOUNTER — TELEPHONE (OUTPATIENT)
Dept: INTERNAL MEDICINE CLINIC | Age: 65
End: 2020-06-22

## 2020-06-24 RX ORDER — MULTIVITAMIN WITH FOLIC ACID 400 MCG
TABLET ORAL
Qty: 30 TABLET | Refills: 5 | Status: SHIPPED | OUTPATIENT
Start: 2020-06-24 | End: 2021-04-28

## 2020-06-24 RX ORDER — B-COMPLEX WITH VITAMIN C
1 TABLET ORAL DAILY
Qty: 30 TABLET | Refills: 1 | Status: SHIPPED | OUTPATIENT
Start: 2020-06-24 | End: 2020-08-28

## 2020-06-24 RX ORDER — L. ACIDOPHILUS/PECTIN, CITRUS 25MM-100MG
TABLET ORAL
Qty: 30 TABLET | Refills: 3 | Status: SHIPPED | OUTPATIENT
Start: 2020-06-24 | End: 2020-10-13

## 2020-07-14 RX ORDER — ALENDRONATE SODIUM 70 MG/1
TABLET ORAL
Qty: 4 TABLET | Refills: 5 | Status: SHIPPED | OUTPATIENT
Start: 2020-07-14

## 2020-07-20 ENCOUNTER — OFFICE VISIT (OUTPATIENT)
Dept: INTERNAL MEDICINE CLINIC | Age: 65
End: 2020-07-20
Payer: COMMERCIAL

## 2020-07-20 VITALS
DIASTOLIC BLOOD PRESSURE: 78 MMHG | SYSTOLIC BLOOD PRESSURE: 124 MMHG | TEMPERATURE: 96.9 F | OXYGEN SATURATION: 99 % | BODY MASS INDEX: 20.77 KG/M2 | WEIGHT: 121 LBS | HEART RATE: 72 BPM

## 2020-07-20 PROBLEM — Z01.419 WELL WOMAN EXAM WITH ROUTINE GYNECOLOGICAL EXAM: Status: ACTIVE | Noted: 2020-07-20

## 2020-07-20 PROCEDURE — 99396 PREV VISIT EST AGE 40-64: CPT | Performed by: INTERNAL MEDICINE

## 2020-07-20 RX ORDER — SENNOSIDES 8.6 MG
TABLET ORAL
COMMUNITY
Start: 2020-07-03

## 2020-07-20 RX ORDER — PANTOPRAZOLE SODIUM 40 MG/1
40 TABLET, DELAYED RELEASE ORAL DAILY
Qty: 30 TABLET | Refills: 5 | Status: SHIPPED | OUTPATIENT
Start: 2020-07-20 | End: 2021-06-17

## 2020-07-20 RX ORDER — ERGOCALCIFEROL 1.25 MG/1
CAPSULE ORAL
Qty: 4 CAPSULE | Refills: 2 | Status: SHIPPED | OUTPATIENT
Start: 2020-07-20 | End: 2020-10-08

## 2020-07-20 NOTE — PATIENT INSTRUCTIONS
Check breast Ultrasound and Mammogram  To schedule your test call:  [facility]  Mon.-Fri. 7 a.m.- 8 p.m., Sat 8a. m.- 3 p.m. Reddy ROMERO ([phone])  Patients: Press Option 2  (a) To schedule a test, procedure or class- Press Option 1.

## 2020-07-20 NOTE — PROGRESS NOTES
SUBJECTIVE:   63 y.o. female for annual routine Pap and checkup. Romanian  assisted with visit       Current Outpatient Medications   Medication Sig Dispense Refill    CVS SENNA 8.6 MG tablet TAKE 1 TABLET BY MOUTH AT BEDTIME AS NEEDED      alendronate (FOSAMAX) 70 MG tablet TAKE 1 TABLET BY MOUTH ONE TIME PER WEEK 4 tablet 5    Multiple Vitamin (DAILY-PENNY) TABS TAKE 1 TABLET BY MOUTH EVERY DAY 30 tablet 5    Calcium Carbonate-Vitamin D (OYSTER SHELL CALCIUM/D) 500-200 MG-UNIT TABS TAKE 1 TABLET BY MOUTH DAILY FOR BONE HEALTH 30 tablet 1    Lactobacillus Acid-Pectin (ACIDOPHILUS/CITRUS PECTIN) TABS TAKE 1 TABLET BY MOUTH EVERY DAY 30 tablet 3    lidocaine (LIDODERM) 5 % Place 1 patch onto the skin daily 12 hours on, 12 hours off. 30 patch 5    cetirizine (ZYRTEC) 10 MG tablet Take 1 tablet by mouth daily as needed for Allergies (itchy ears and itchy eyes) 30 tablet 5    sertraline (ZOLOFT) 50 MG tablet TAKE 1 TABLET BY MOUTH EVERY DAY EVERY NIGHT 30 tablet 3    donepezil (ARICEPT) 10 MG tablet TAKE 1 TABLET BY MOUTH NIGHTLY FOR DEMENTIA 30 tablet 2    CVS MELATONIN 5 MG TABS tablet TAKE 1 TABLET BY MOUTH NIGHTLY 30 MINUTES BEFORE BED 30 tablet 3    pantoprazole (PROTONIX) 40 MG tablet TAKE 1 TABLET BY MOUTH DAILY FOR STOMACH PAIN 30 tablet 3    CARAFATE 1 GM/10ML suspension TAKE 10 MLS BY MOUTH NIGHTLY FOR CHEST PAIN/GASTRIC PAIN 300 mL 3    vitamin D (ERGOCALCIFEROL) 1.25 MG (67779 UT) CAPS capsule TAKE 1 CAPSULE BY MOUTH ONE TIME PER WEEK 4 capsule 2    carboxymethylcellulose PF (REFRESH PLUS) 0.5 % SOLN ophthalmic solution Place 1 drop into both eyes 4 times daily 1 Bottle 5    pseudoephedrine (SUDAFED) 60 MG tablet Take 1 tablet by mouth 2 times daily 20 tablet 0    fluticasone (FLONASE) 50 MCG/ACT nasal spray 1 spray by Nasal route daily 1 Bottle 5    SUMAtriptan (IMITREX) 50 MG tablet TAKE 1 TABLET ONCE AS NEEDED FOR MIGRAINE MAY REPEAT IN TWO HRS IF NO IMPROVEMENT.  MAX 2 TABS A DAY 9 tablet 3    diclofenac sodium 1 % GEL Apply 2 g topically 2 times daily For chest wall pain 1 Tube 3    acetaminophen (APAP EXTRA STRENGTH) 500 MG tablet Take 1 tablet by mouth every 6 hours as needed for Pain 120 tablet 3     No current facility-administered medications for this visit. Allergies: Nsaids   No LMP recorded. Patient is postmenopausal.    ROS:  Feeling well. No dyspnea or chest pain on exertion. No abdominal pain, change in bowel habits, black or bloody stools. No urinary tract symptoms. GYN ROS: post menopausal, no abnormal bleeding, pelvic pain or discharge, no breast pain or new or enlarging lumps on self exam. No neurological complaints. OBJECTIVE:   The patient appears well, alert, oriented x 3, in no distress. Wt 121 lb (54.9 kg)   BMI 20.77 kg/m²   ENT normal.  Neck supple. No adenopathy or thyromegaly. DAMARIS. Lungs are clear, good air entry, no wheezes, rhonchi or rales. S1 and S2 normal, no murmurs, regular rate and rhythm. Abdomen soft without tenderness, guarding, mass or organomegaly. Extremities show no edema, normal peripheral pulses. Neurological is normal, no focal findings.     BREAST EXAM: right breast normal without mass, skin or nipple changes or axillary nodes, abnormal mass palpable left breast- at 12'o clock just superior to nipple, no axillary lymphadenopathy    PELVIC EXAM: normal external genitalia, vulva, vagina, cervix, uterus and adnexa, PAP: Pap smear done today, thin-prep method    ASSESSMENT:   Well Woman  Breast mass    PLAN:   mammogram  pap smear  Check breast US and diagnostic mammogram     Kyree Fragoso MD

## 2020-07-21 ENCOUNTER — TELEPHONE (OUTPATIENT)
Dept: INTERNAL MEDICINE CLINIC | Age: 65
End: 2020-07-21

## 2020-07-21 DIAGNOSIS — N63.20 LEFT BREAST MASS: Primary | ICD-10-CM

## 2020-07-22 LAB
HPV COMMENT: NORMAL
HPV TYPE 16: NOT DETECTED
HPV TYPE 18: NOT DETECTED
HPVOH (OTHER TYPES): NOT DETECTED

## 2020-08-06 ENCOUNTER — TELEPHONE (OUTPATIENT)
Dept: INTERNAL MEDICINE CLINIC | Age: 65
End: 2020-08-06

## 2020-08-19 PROBLEM — Z01.419 WELL WOMAN EXAM WITH ROUTINE GYNECOLOGICAL EXAM: Status: RESOLVED | Noted: 2020-07-20 | Resolved: 2020-08-19

## 2020-08-20 ENCOUNTER — TELEPHONE (OUTPATIENT)
Dept: INTERNAL MEDICINE CLINIC | Age: 65
End: 2020-08-20

## 2020-08-28 RX ORDER — B-COMPLEX WITH VITAMIN C
1 TABLET ORAL DAILY
Qty: 30 TABLET | Refills: 1 | Status: SHIPPED | OUTPATIENT
Start: 2020-08-28 | End: 2021-03-24

## 2020-08-28 RX ORDER — DONEPEZIL HYDROCHLORIDE 10 MG/1
10 TABLET, FILM COATED ORAL NIGHTLY
Qty: 30 TABLET | Refills: 2 | Status: SHIPPED | OUTPATIENT
Start: 2020-08-28 | End: 2021-01-06 | Stop reason: SDUPTHER

## 2020-09-03 ENCOUNTER — HOSPITAL ENCOUNTER (OUTPATIENT)
Dept: WOMENS IMAGING | Age: 65
Discharge: HOME | End: 2020-09-03
Payer: COMMERCIAL

## 2020-09-03 ENCOUNTER — APPOINTMENT (OUTPATIENT)
Dept: ULTRASOUND IMAGING | Age: 65
End: 2020-09-03
Payer: COMMERCIAL

## 2020-09-03 PROCEDURE — G0279 TOMOSYNTHESIS, MAMMO: HCPCS

## 2020-09-18 ENCOUNTER — APPOINTMENT (OUTPATIENT)
Dept: GENERAL RADIOLOGY | Age: 65
End: 2020-09-18
Payer: COMMERCIAL

## 2020-09-18 ENCOUNTER — HOSPITAL ENCOUNTER (EMERGENCY)
Age: 65
Discharge: HOME | End: 2020-09-18
Payer: COMMERCIAL

## 2020-09-18 VITALS
TEMPERATURE: 96.7 F | HEIGHT: 63 IN | RESPIRATION RATE: 16 BRPM | SYSTOLIC BLOOD PRESSURE: 124 MMHG | HEART RATE: 86 BPM | WEIGHT: 121 LBS | DIASTOLIC BLOOD PRESSURE: 85 MMHG | OXYGEN SATURATION: 98 % | BODY MASS INDEX: 21.44 KG/M2

## 2020-09-18 PROCEDURE — 6370000000 HC RX 637 (ALT 250 FOR IP): Performed by: PHYSICIAN ASSISTANT

## 2020-09-18 PROCEDURE — 99283 EMERGENCY DEPT VISIT LOW MDM: CPT

## 2020-09-18 PROCEDURE — 72100 X-RAY EXAM L-S SPINE 2/3 VWS: CPT

## 2020-09-18 RX ORDER — METHOCARBAMOL 500 MG/1
500 TABLET, FILM COATED ORAL EVERY 8 HOURS PRN
Qty: 30 TABLET | Refills: 0 | Status: SHIPPED | OUTPATIENT
Start: 2020-09-18 | End: 2020-09-28

## 2020-09-18 RX ORDER — METHOCARBAMOL 500 MG/1
750 TABLET, FILM COATED ORAL ONCE
Status: COMPLETED | OUTPATIENT
Start: 2020-09-18 | End: 2020-09-18

## 2020-09-18 RX ORDER — NAPROXEN 250 MG/1
500 TABLET ORAL ONCE
Status: COMPLETED | OUTPATIENT
Start: 2020-09-18 | End: 2020-09-18

## 2020-09-18 RX ORDER — MELOXICAM 7.5 MG/1
7.5 TABLET ORAL DAILY
Qty: 15 TABLET | Refills: 0 | Status: SHIPPED | OUTPATIENT
Start: 2020-09-18 | End: 2021-01-06 | Stop reason: SDUPTHER

## 2020-09-18 RX ADMIN — NAPROXEN 500 MG: 250 TABLET ORAL at 12:48

## 2020-09-18 RX ADMIN — METHOCARBAMOL TABLETS 750 MG: 500 TABLET, COATED ORAL at 12:48

## 2020-09-18 ASSESSMENT — ENCOUNTER SYMPTOMS
BACK PAIN: 1
NAUSEA: 0
SHORTNESS OF BREATH: 0
DIARRHEA: 0
VOMITING: 0
ABDOMINAL PAIN: 0
CHEST TIGHTNESS: 0

## 2020-09-18 ASSESSMENT — PAIN DESCRIPTION - ORIENTATION: ORIENTATION: LOWER

## 2020-09-18 ASSESSMENT — PAIN SCALES - GENERAL: PAINLEVEL_OUTOF10: 10

## 2020-09-18 ASSESSMENT — PAIN DESCRIPTION - LOCATION
LOCATION: BACK
LOCATION: ABDOMEN;BACK

## 2020-09-18 NOTE — ED PROVIDER NOTES
experiencing any abdominal pain. Specifically has no GI or  complaints. Nursing Notes were all reviewed and agreed with or any disagreements were addressed in the HPI. REVIEW OF SYSTEMS    (2-9 systems for level 4, 10 or more for level 5)     Review of Systems   Constitutional: Negative for activity change, chills and fever. Respiratory: Negative for chest tightness and shortness of breath. Cardiovascular: Negative for chest pain. Gastrointestinal: Negative for abdominal pain, diarrhea, nausea and vomiting. Genitourinary: Negative for dysuria and flank pain. Musculoskeletal: Positive for back pain. Negative for gait problem. Skin: Negative for rash and wound. Neurological: Negative for seizures and headaches. Positives and Pertinent negatives as per HPI. Except as noted above in the ROS, all other systems were reviewed and negative.        PAST MEDICAL HISTORY     Past Medical History:   Diagnosis Date    Allergic rhinitis     Chronic allergic rhinitis due to pollen 1/19/2018    Dementia associated with other underlying disease without behavioral disturbance ([facility] [address] 7/27/2018    On Aricept     Gastroesophageal reflux disease without esophagitis 1/19/2018    Migraine without aura and without status migrainosus, not intractable 1/19/2018    Mild episode of recurrent major depressive disorder ([facility] [address] 1/19/2018    Vitamin D deficiency 2/28/2018         SURGICAL HISTORY     Past Surgical History:   Procedure Laterality Date    THROAT SURGERY  2017         CURRENTMEDICATIONS       Previous Medications    ACETAMINOPHEN (APAP EXTRA STRENGTH) 500 MG TABLET    Take 1 tablet by mouth every 6 hours as needed for Pain    ALENDRONATE (FOSAMAX) 70 MG TABLET    TAKE 1 TABLET BY MOUTH ONE TIME PER WEEK    CALCIUM CARBONATE-VITAMIN D (OYSTER SHELL CALCIUM/D) 500-200 MG-UNIT TABS    TAKE 1 TABLET BY MOUTH DAILY FOR BONE HEALTH    CARAFATE 1 GM/10ML SUSPENSION    TAKE 10 MLS BY MOUTH NIGHTLY FOR CHEST PAIN/GASTRIC PAIN    CARBOXYMETHYLCELLULOSE PF (REFRESH PLUS) 0.5 % SOLN OPHTHALMIC SOLUTION    Place 1 drop into both eyes 4 times daily    CETIRIZINE (ZYRTEC) 10 MG TABLET    Take 1 tablet by mouth daily as needed for Allergies (itchy ears and itchy eyes)    [facility] MELATONIN 5 MG TABS TABLET    TAKE 1 TABLET BY MOUTH NIGHTLY 30 MINUTES BEFORE BED    [facility] SENNA 8.6 MG TABLET    TAKE 1 TABLET BY MOUTH AT BEDTIME AS NEEDED    DONEPEZIL (ARICEPT) 10 MG TABLET    TAKE 1 TABLET BY MOUTH NIGHTLY FOR DEMENTIA    FLUTICASONE (FLONASE) 50 MCG/ACT NASAL SPRAY    1 spray by Nasal route daily    LACTOBACILLUS ACID-PECTIN (ACIDOPHILUS/CITRUS PECTIN) TABS    TAKE 1 TABLET BY MOUTH EVERY DAY    LIDOCAINE (LIDODERM) 5 %    Place 1 patch onto the skin daily 12 hours on, 12 hours off. MULTIPLE VITAMIN (DAILY-PENNY) TABS    TAKE 1 TABLET BY MOUTH EVERY DAY    PANTOPRAZOLE (PROTONIX) 40 MG TABLET    Take 1 tablet by mouth daily For stomach pain    PSEUDOEPHEDRINE (SUDAFED) 60 MG TABLET    Take 1 tablet by mouth 2 times daily    SERTRALINE (ZOLOFT) 50 MG TABLET    TAKE 1 TABLET BY MOUTH EVERY DAY EVERY NIGHT    SUMATRIPTAN (IMITREX) 50 MG TABLET    TAKE 1 TABLET ONCE AS NEEDED FOR MIGRAINE MAY REPEAT IN TWO HRS IF NO IMPROVEMENT.  MAX 2 TABS A DAY    VITAMIN D (ERGOCALCIFEROL) 1.25 MG (11441 UT) CAPS CAPSULE    TAKE 1 CAPSULE BY MOUTH ONE TIME PER WEEK         ALLERGIES     Nsaids    FAMILYHISTORY       Family History   Problem Relation Age of Onset    Diabetes Neg Hx     Heart Disease Neg Hx     Cancer Neg Hx           SOCIAL HISTORY       Social History     Tobacco Use    Smoking status: Never Smoker    Smokeless tobacco: Former User     Types: Chew   Substance Use Topics    Alcohol use: No    Drug use: No       SCREENINGS             PHYSICAL EXAM    (up to 7 for level 4, 8 or more for level 5)     ED Triage Vitals [09/18/20 1213]   BP Temp Temp Source Pulse Resp SpO2 Height Weight   124/85 96.7 °F (35.9 °C) Infrared 86 16 98 % 5' 3\" (1.6 m) 121 lb (54.9 kg)       Physical Exam  Vitals signs and nursing note reviewed. Constitutional:       General: She is awake. She is not in acute distress. Appearance: Normal appearance. She is well-developed. She is not ill-appearing or diaphoretic. HENT:      Head: Normocephalic and atraumatic. Right Ear: External ear normal.      Left Ear: External ear normal.   Eyes:      General: No scleral icterus. Right eye: No discharge. Left eye: No discharge. Conjunctiva/sclera: Conjunctivae normal.   Neck:      Musculoskeletal: Normal range of motion. Vascular: No JVD. Cardiovascular:      Rate and Rhythm: Normal rate and regular rhythm. Heart sounds: No murmur. No friction rub. No gallop. Pulmonary:      Effort: Pulmonary effort is normal. No accessory muscle usage or respiratory distress. Breath sounds: Normal breath sounds. No wheezing, rhonchi or rales. Abdominal:      General: Abdomen is flat. Bowel sounds are normal. There is no distension. Palpations: Abdomen is soft. Abdomen is not rigid. There is no mass. Tenderness: There is no abdominal tenderness. There is no guarding or rebound. Musculoskeletal:      Lumbar back: She exhibits decreased range of motion, tenderness, bony tenderness and pain. She exhibits no swelling, no edema, no deformity, no laceration, no spasm and normal pulse. Comments: Diffuse nonfocal midline tenderness lumbar spine. No evidence of step-off. Right greater than left lateral lumbar paraspinous tenderness to palpation. Patient has a negative straight leg raise. The patient demonstrates 5/5 muscle strength to hip flexion, knee flexion/extension, plantar/dorsiflexion of the ankle and extensor hallicus longus testing. Skin:     General: Skin is warm and dry. Neurological:      Mental Status: She is alert and oriented to person, place, and time. GCS: GCS eye subscore is 4. GCS verbal subscore is 5. GCS motor subscore is 6. Cranial Nerves: No cranial nerve deficit. Sensory: No sensory deficit. Coordination: Coordination normal.   Psychiatric:         Behavior: Behavior normal. Behavior is cooperative. DIAGNOSTIC RESULTS   LABS:    Labs Reviewed - No data to display    All other labs were within normal range or not returned as of this dictation. EKG: All EKG's are interpreted by the Emergency Department Physician in the absence of a cardiologist.  Please see their note for interpretation of EKG. RADIOLOGY:   Non-plain film images such as CT, Ultrasound and MRI are read by the radiologist. Plain radiographic images are visualized and preliminarily interpreted by the ED Provider with the below findings:        Interpretation per the Radiologist below, if available at the time of this note:    XR LUMBAR SPINE (2-3 VIEWS)   Final Result   No acute abnormality. PROCEDURES   Unless otherwise noted below, none     Procedures    CRITICAL CARE TIME   N/A    CONSULTS:  None      EMERGENCY DEPARTMENT COURSE and DIFFERENTIAL DIAGNOSIS/MDM:   Vitals:    Vitals:    09/18/20 1213   BP: 124/85   Pulse: 86   Resp: 16   Temp: 96.7 °F (35.9 °C)   TempSrc: Infrared   SpO2: 98%   Weight: 121 lb (54.9 kg)   Height: 5' 3\" (1.6 m)       Patient was given the following medications:  Medications   naproxen (NAPROSYN) tablet 500 mg (500 mg Oral Given 9/18/20 1248)   methocarbamol (ROBAXIN) tablet 750 mg (750 mg Oral Given 9/18/20 1248)           The patient's detailed history of present illness is documented as above. Upon arrival to the emergency department the patient's vital signs are as documented. The patient is noted to be hemodynamically stable and afebrile. Physical examination findings are as above. Patient was medicated as above. Radiographs of lumbar spine are completed as above and demonstrate no evidence of acute bony abnormality.   I suggested initiation of treatment for the patient's acute symptoms. She will be discharged with Mobic and Robaxin. She will continue with her Lidoderm patches. She is asked to continue with her Fosamax. Have asked her also to make an appointment with Dr. Felton Tuttle to discuss ongoing care and treatment of her recurrent chronic low back pain. Patient's questions been answered and she be discharged home. The patient has been made aware of the signs and symptoms which would necessitate an immediate return to the emergency department and verbalizes an understanding of these signs and symptoms. I estimate there is low risk for cauda equina or central cord compression syndrome, epidural lesion or abscess, meningitis or acute/severe spinal stenosis requiring emergent treatment and or any additional pathology that would require further emergency advanced imaging or admission and therefor I consider the discharge disposition most reasonable. The patient and/or family and I have discussed the diagnosis and risks, and we agree with discharging home to follow-up with their primary doctor. We also discussed returning to the emergency department immediately if new or worsening symptoms occur. We have discussed the symptoms which are most concerning (e.g., numbness or weakness of the arms or legs, saddle anesthesia, urinary or bowel incontinence or retention, changing or worsening pain) that would necessitate an immediate return. FINAL IMPRESSION      1.  Acute exacerbation of chronic low back pain          DISPOSITION/PLAN   DISPOSITION: Discharged home      PATIENT REFERREDTO:  Luis Fernando Ash MD  [address] [place] [address]  [phone]          [facility] Emergency Department  [address]  [phone]    If symptoms worsen      DISCHARGE MEDICATIONS:  New Prescriptions    MELOXICAM (MOBIC) 7.5 MG TABLET    Take 1 tablet by mouth daily    METHOCARBAMOL (ROBAXIN) 500 MG TABLET    Take 1 tablet by mouth every 8 hours as needed (muscle cramps or pain)       DISCONTINUED MEDICATIONS:  Discontinued Medications    DICLOFENAC SODIUM 1 % GEL    Apply 2 g topically 2 times daily For chest wall pain              (Please note that portions of this note were completed with a voice recognition program.  Efforts were made to edit the dictations but occasionally words are mis-transcribed.)    Anna Johnson PA-C (electronically signed)           Justin Gilford, PA-C  09/18/20 1501

## 2020-09-25 ENCOUNTER — TELEPHONE (OUTPATIENT)
Dept: ADMINISTRATIVE | Age: 65
End: 2020-09-25

## 2020-09-28 RX ORDER — CHOLECALCIFEROL (VITAMIN D3) 125 MCG
CAPSULE ORAL
Qty: 30 TABLET | Refills: 3 | Status: SHIPPED | OUTPATIENT
Start: 2020-09-28 | End: 2021-01-06 | Stop reason: SDUPTHER

## 2020-09-30 ENCOUNTER — TELEPHONE (OUTPATIENT)
Dept: INTERNAL MEDICINE CLINIC | Age: 65
End: 2020-09-30

## 2020-09-30 NOTE — TELEPHONE ENCOUNTER
Patient Has a new pharmacy [facility] [place], [address] [address] -  [phone]

## 2020-09-30 NOTE — TELEPHONE ENCOUNTER
Pharmacy changed. LVM with patient's family asking if they would be establishing with a PCP up there, also wanting to know if because of the pharmacy change if she needs refills.

## 2020-10-01 ENCOUNTER — TELEPHONE (OUTPATIENT)
Dept: INTERNAL MEDICINE CLINIC | Age: 65
End: 2020-10-01

## 2020-10-01 NOTE — TELEPHONE ENCOUNTER
Received fax from [address] [facility]. Needs Dr. Carla Ibarra signature. Placed in Dr. Carla Ibarra bin.

## 2020-10-02 RX ORDER — CARBOXYMETHYLCELLULOSE SODIUM 5 MG/ML
1 SOLUTION/ DROPS OPHTHALMIC 4 TIMES DAILY
Qty: 1 BOTTLE | Refills: 5 | Status: SHIPPED | OUTPATIENT
Start: 2020-10-02

## 2020-10-02 NOTE — TELEPHONE ENCOUNTER
----- Message from Rosaline May sent at 10/2/2020 12:56 PM EDT -----  Subject: Refill Request    QUESTIONS  Name of Medication? carboxymethylcellulose PF (REFRESH PLUS) 0.5 % SOLN   ophthalmic solution  Patient-reported dosage and instructions? 7.5 mg tablet 1 time a day  How many days do you have left? 0  Preferred Pharmacy? [facility] phone number (if available)? [phone]  Additional Information for Provider?   ---------------------------------------------------------------------------  --------------  CALL BACK INFO  What is the best way for the office to contact you? OK to leave message on   voicemail  Preferred Call Back Phone Number?  [phone]

## 2020-10-02 NOTE — TELEPHONE ENCOUNTER
----- Message from Anurag Julien sent at 10/2/2020 12:57 PM EDT -----  Subject: Refill Request    QUESTIONS  Name of Medication? sertraline (ZOLOFT) 50 MG tablet  Patient-reported dosage and instructions? 5mg every 8 hours   How many days do you have left? 0  Preferred Pharmacy? [facility] phone number (if available)? [phone]  Additional Information for Provider?   ---------------------------------------------------------------------------  --------------  CALL BACK INFO  What is the best way for the office to contact you? OK to leave message on   voicemail  Preferred Call Back Phone Number?  [phone]

## 2020-10-06 ENCOUNTER — TELEPHONE (OUTPATIENT)
Dept: INTERNAL MEDICINE CLINIC | Age: 65
End: 2020-10-06

## 2020-10-06 NOTE — TELEPHONE ENCOUNTER
Received fax from [facility]. Needs Dr. Keshawn Hernandez signature. Placed in Dr. Keshawn Hernandez bin.

## 2020-10-08 RX ORDER — ERGOCALCIFEROL 1.25 MG/1
CAPSULE ORAL
Qty: 4 CAPSULE | Refills: 2 | Status: SHIPPED | OUTPATIENT
Start: 2020-10-08 | End: 2021-01-06 | Stop reason: SDUPTHER

## 2020-10-13 RX ORDER — L. ACIDOPHILUS/PECTIN, CITRUS 25MM-100MG
TABLET ORAL
Qty: 30 TABLET | Refills: 3 | Status: SHIPPED | OUTPATIENT
Start: 2020-10-13 | End: 2021-05-14 | Stop reason: SDUPTHER

## 2020-10-28 ENCOUNTER — TELEPHONE (OUTPATIENT)
Dept: INTERNAL MEDICINE CLINIC | Age: 65
End: 2020-10-28

## 2020-11-02 ENCOUNTER — TELEPHONE (OUTPATIENT)
Dept: INTERNAL MEDICINE CLINIC | Age: 65
End: 2020-11-02

## 2020-12-21 PROBLEM — K21.9 GASTROESOPHAGEAL REFLUX DISEASE: Status: ACTIVE | Noted: 2020-12-21

## 2020-12-21 PROBLEM — F03.90 DEMENTIA WITHOUT BEHAVIORAL DISTURBANCE (HCC): Status: ACTIVE | Noted: 2020-12-21

## 2020-12-21 PROBLEM — M81.0 AGE RELATED OSTEOPOROSIS: Status: ACTIVE | Noted: 2020-12-21

## 2021-01-06 DIAGNOSIS — E55.9 VITAMIN D DEFICIENCY: ICD-10-CM

## 2021-01-06 RX ORDER — ALENDRONATE SODIUM 70 MG/1
TABLET ORAL
Qty: 4 TABLET | Refills: 0 | OUTPATIENT
Start: 2021-01-06

## 2021-01-06 RX ORDER — B-COMPLEX WITH VITAMIN C
1 TABLET ORAL DAILY
Qty: 30 TABLET | Refills: 0 | OUTPATIENT
Start: 2021-01-06 | End: 2022-01-06

## 2021-03-23 DIAGNOSIS — E55.9 VITAMIN D DEFICIENCY: ICD-10-CM

## 2021-03-24 RX ORDER — B-COMPLEX WITH VITAMIN C
1 TABLET ORAL DAILY
Qty: 30 TABLET | Refills: 0 | Status: SHIPPED | OUTPATIENT
Start: 2021-03-24 | End: 2021-04-28

## 2021-04-28 DIAGNOSIS — E55.9 VITAMIN D DEFICIENCY: ICD-10-CM

## 2021-04-28 RX ORDER — MULTIVITAMIN
TABLET ORAL
Qty: 30 TABLET | Refills: 0 | Status: SHIPPED | OUTPATIENT
Start: 2021-04-28 | End: 2021-06-02

## 2021-04-28 RX ORDER — B-COMPLEX WITH VITAMIN C
TABLET ORAL
Qty: 30 TABLET | Refills: 0 | Status: SHIPPED | OUTPATIENT
Start: 2021-04-28 | End: 2021-06-02

## 2021-06-02 DIAGNOSIS — E55.9 VITAMIN D DEFICIENCY: ICD-10-CM

## 2021-06-02 RX ORDER — MULTIVITAMIN
TABLET ORAL
Qty: 30 TABLET | Refills: 0 | Status: SHIPPED | OUTPATIENT
Start: 2021-06-02 | End: 2021-06-17

## 2021-06-02 RX ORDER — ERGOCALCIFEROL 1.25 MG/1
CAPSULE ORAL
Qty: 4 CAPSULE | Refills: 0 | Status: SHIPPED | OUTPATIENT
Start: 2021-06-02

## 2021-06-02 RX ORDER — B-COMPLEX WITH VITAMIN C
TABLET ORAL
Qty: 30 TABLET | Refills: 0 | Status: SHIPPED | OUTPATIENT
Start: 2021-06-02

## 2021-06-23 DIAGNOSIS — K21.9 GASTROESOPHAGEAL REFLUX DISEASE WITHOUT ESOPHAGITIS: ICD-10-CM

## 2021-06-23 RX ORDER — SUCRALFATE ORAL 1 G/10ML
SUSPENSION ORAL
Qty: 300 ML | Refills: 0 | OUTPATIENT
Start: 2021-06-23

## 2021-07-29 PROBLEM — R10.9 CHRONIC ABDOMINAL PAIN: Status: ACTIVE | Noted: 2021-07-29

## 2021-07-29 PROBLEM — G89.29 CHRONIC ABDOMINAL PAIN: Status: ACTIVE | Noted: 2021-07-29

## 2022-10-10 PROBLEM — R14.0 BLOATING: Status: ACTIVE | Noted: 2022-10-10

## 2022-10-10 PROBLEM — K64.8 INTERNAL HEMORRHOIDS: Status: ACTIVE | Noted: 2022-10-10

## 2022-10-10 PROBLEM — K59.00 CONSTIPATION: Status: ACTIVE | Noted: 2022-10-10
